# Patient Record
Sex: FEMALE | Race: WHITE | NOT HISPANIC OR LATINO | Employment: FULL TIME | URBAN - METROPOLITAN AREA
[De-identification: names, ages, dates, MRNs, and addresses within clinical notes are randomized per-mention and may not be internally consistent; named-entity substitution may affect disease eponyms.]

---

## 2018-10-08 ENCOUNTER — TELEMEDICINE2 (OUTPATIENT)
Dept: URGENT CARE | Facility: PHYSICIAN GROUP | Age: 33
End: 2018-10-08

## 2018-10-08 DIAGNOSIS — R05.9 COUGH: ICD-10-CM

## 2018-10-08 DIAGNOSIS — J01.40 ACUTE PANSINUSITIS, RECURRENCE NOT SPECIFIED: ICD-10-CM

## 2018-10-08 PROCEDURE — 99203 OFFICE O/P NEW LOW 30 MIN: CPT | Performed by: PHYSICIAN ASSISTANT

## 2018-10-08 RX ORDER — ESCITALOPRAM OXALATE 10 MG/1
10 TABLET ORAL DAILY
COMMUNITY
End: 2020-01-24

## 2018-10-08 RX ORDER — BENZONATATE 100 MG/1
100 CAPSULE ORAL 3 TIMES DAILY PRN
Qty: 30 CAP | Refills: 0 | Status: SHIPPED | OUTPATIENT
Start: 2018-10-08 | End: 2020-01-24

## 2018-10-08 RX ORDER — AMOXICILLIN AND CLAVULANATE POTASSIUM 875; 125 MG/1; MG/1
1 TABLET, FILM COATED ORAL 2 TIMES DAILY
Qty: 14 TAB | Refills: 0 | Status: SHIPPED | OUTPATIENT
Start: 2018-10-08 | End: 2018-10-15

## 2018-10-08 ASSESSMENT — ENCOUNTER SYMPTOMS
CHILLS: 1
DIZZINESS: 0
VOMITING: 0
HEADACHES: 1
SINUS PRESSURE: 1
ABDOMINAL PAIN: 0
SINUS PAIN: 1
SORE THROAT: 1
SHORTNESS OF BREATH: 0
MYALGIAS: 1
EYE DISCHARGE: 1
WHEEZING: 0
COUGH: 1

## 2018-10-08 NOTE — PROGRESS NOTES
Subjective:      Stew Maria is a 33 y.o. female who presents with Cough (Productive green cough, bilateral eye irritation, sinus pressure and pain, fever x 3-4 days )            Patient is a pleasant 33-year-old female via telemedicine who reports sinus congestion, pressure for the last 2-3 weeks.  Patient reports she thought this was allergies at first as she also had itchy eyes and a runny nose with intermittent sneezing.  She reports that then she developed the significant pressure, dental pain, ear pain and now is with a cough.  She does report an intermittent sore throat in the mornings however feels this is secondary to postnasal drainage.  She has been utilizing Mucinex and Flonase with mild relief his symptoms.  She reports subjective fevers however denies any at this time.  She denies any shortness of breath or wheezing.      Sinusitis   This is a new problem. Episode onset: 2-3 weeks ago. The problem has been waxing and waning since onset. Associated symptoms include chills, congestion, coughing, ear pain, headaches, sinus pressure and a sore throat. Pertinent negatives include no shortness of breath. Treatments tried: As above.       Review of Systems   Constitutional: Positive for chills and malaise/fatigue.   HENT: Positive for congestion, ear pain, sinus pain, sinus pressure and sore throat. Negative for ear discharge.         Pos. For ear pressure     Eyes: Positive for discharge.   Respiratory: Positive for cough. Negative for shortness of breath and wheezing.    Gastrointestinal: Negative for abdominal pain and vomiting.   Genitourinary: Negative for dysuria and urgency.   Musculoskeletal: Positive for myalgias.   Skin: Negative for itching and rash.   Neurological: Positive for headaches. Negative for dizziness.   All other systems reviewed and are negative.         Objective:     There were no vitals taken for this visit.     Physical Exam   Constitutional: She appears well-developed  and well-nourished.   HENT:   Nose: Nose normal.   Bilateral maxillary sinus tenderness when patient palpates.   Eyes: Pupils are equal, round, and reactive to light. EOM are normal.   Neck: Normal range of motion. Neck supple.   Pulmonary/Chest: Effort normal. No respiratory distress.   Neurological: She is alert.   Skin:   No rash noted on areas exposed during telemedicine.               Assessment/Plan:     1. Acute pansinusitis, recurrence not specified  - amoxicillin-clavulanate (AUGMENTIN) 875-125 MG Tab; Take 1 Tab by mouth 2 times a day for 7 days.  Dispense: 14 Tab; Refill: 0    2. Cough  - benzonatate (TESSALON) 100 MG Cap; Take 1 Cap by mouth 3 times a day as needed for Cough.  Dispense: 30 Cap; Refill: 0    Due to duration of symptoms, sinus tenderness, and failure of OTC therapies- ABX was written to tx. For bacterial etiology for sinusitis.   Continue OTC supportive therapies- Flonase, OTC allergy meds, avoid night time dairy. Increase fluids. Humidification.   Patient given precautionary s/sx that mandate immediate follow up and evaluation in the ED. Advised of risks of not doing so.    DDX, Supportive care, and indications for immediate follow-up discussed with patient.    Instructed to return to clinic or nearest emergency department if we are not available for any change in condition, further concerns, or worsening of symptoms.    The patient demonstrated a good understanding and agreed with the treatment plan      Patient was presented for a telehealth consultation via secure and encrypted videoconferencing technology.

## 2019-04-16 ENCOUNTER — HOSPITAL ENCOUNTER (OUTPATIENT)
Dept: LAB | Facility: MEDICAL CENTER | Age: 34
End: 2019-04-16
Attending: OBSTETRICS & GYNECOLOGY
Payer: COMMERCIAL

## 2019-04-16 PROCEDURE — 84436 ASSAY OF TOTAL THYROXINE: CPT

## 2019-04-16 PROCEDURE — 84443 ASSAY THYROID STIM HORMONE: CPT

## 2019-04-17 LAB
T4 SERPL-MCNC: 7.9 UG/DL (ref 4–12)
TSH SERPL DL<=0.005 MIU/L-ACNC: 0.88 UIU/ML (ref 0.38–5.33)

## 2020-01-24 ENCOUNTER — OFFICE VISIT (OUTPATIENT)
Dept: URGENT CARE | Facility: CLINIC | Age: 35
End: 2020-01-24
Payer: COMMERCIAL

## 2020-01-24 VITALS
RESPIRATION RATE: 12 BRPM | WEIGHT: 207.01 LBS | OXYGEN SATURATION: 98 % | SYSTOLIC BLOOD PRESSURE: 122 MMHG | HEART RATE: 84 BPM | HEIGHT: 68 IN | DIASTOLIC BLOOD PRESSURE: 76 MMHG | BODY MASS INDEX: 31.37 KG/M2 | TEMPERATURE: 98.3 F

## 2020-01-24 DIAGNOSIS — J02.8 ACUTE PHARYNGITIS DUE TO OTHER SPECIFIED ORGANISMS: ICD-10-CM

## 2020-01-24 DIAGNOSIS — J01.40 ACUTE NON-RECURRENT PANSINUSITIS: ICD-10-CM

## 2020-01-24 PROCEDURE — 99204 OFFICE O/P NEW MOD 45 MIN: CPT | Performed by: INTERNAL MEDICINE

## 2020-01-24 RX ORDER — CEFDINIR 300 MG/1
300 CAPSULE ORAL 2 TIMES DAILY
Qty: 14 CAP | Refills: 0 | Status: SHIPPED
Start: 2020-01-24 | End: 2020-01-24 | Stop reason: SDUPTHER

## 2020-01-24 RX ORDER — CEFDINIR 300 MG/1
300 CAPSULE ORAL 2 TIMES DAILY
Qty: 14 CAP | Refills: 0 | Status: SHIPPED | OUTPATIENT
Start: 2020-01-24 | End: 2020-01-31

## 2020-01-24 RX ORDER — FLUOXETINE HYDROCHLORIDE 40 MG/1
40 CAPSULE ORAL DAILY
COMMUNITY
End: 2024-02-21

## 2020-01-24 ASSESSMENT — ENCOUNTER SYMPTOMS
RHINORRHEA: 1
COUGH: 1
FEVER: 1
WHEEZING: 1
CHILLS: 1
SHORTNESS OF BREATH: 1
MYALGIAS: 1
SORE THROAT: 1

## 2020-01-24 NOTE — PROGRESS NOTES
Subjective:     Stew Maria is a 34 y.o. female who presents for Cough (x 1 week / neck pain / wheezing at night / sore throat)       Cough   This is a new problem. The current episode started in the past 7 days. The problem has been gradually worsening. The problem occurs constantly. The cough is productive of purulent sputum. Associated symptoms include chills, a fever (subjective), myalgias, nasal congestion, rhinorrhea, a sore throat, shortness of breath and wheezing. Pertinent negatives include no rash.   History reviewed. No pertinent past medical history.  Past Surgical History:   Procedure Laterality Date   • LEEP       Social History     Socioeconomic History   • Marital status:      Spouse name: Not on file   • Number of children: Not on file   • Years of education: Not on file   • Highest education level: Not on file   Occupational History   • Not on file   Social Needs   • Financial resource strain: Not on file   • Food insecurity:     Worry: Not on file     Inability: Not on file   • Transportation needs:     Medical: Not on file     Non-medical: Not on file   Tobacco Use   • Smoking status: Never Smoker   • Smokeless tobacco: Never Used   Substance and Sexual Activity   • Alcohol use: No   • Drug use: No   • Sexual activity: Not on file   Lifestyle   • Physical activity:     Days per week: Not on file     Minutes per session: Not on file   • Stress: Not on file   Relationships   • Social connections:     Talks on phone: Not on file     Gets together: Not on file     Attends Protestant service: Not on file     Active member of club or organization: Not on file     Attends meetings of clubs or organizations: Not on file     Relationship status: Not on file   • Intimate partner violence:     Fear of current or ex partner: Not on file     Emotionally abused: Not on file     Physically abused: Not on file     Forced sexual activity: Not on file   Other Topics Concern   • Not on file  "  Social History Narrative   • Not on file    History reviewed. No pertinent family history. Review of Systems   Constitutional: Positive for chills and fever (subjective).   HENT: Positive for rhinorrhea and sore throat.    Respiratory: Positive for cough, shortness of breath and wheezing.    Musculoskeletal: Positive for myalgias.   Skin: Negative for rash.   All other systems reviewed and are negative.    Allergies   Allergen Reactions   • Anti-Itch    • Antihistamine [Altaryl]      \"I get into too deep of a sleep.\"      Objective:   /76   Pulse 84   Temp 36.8 °C (98.3 °F) (Temporal)   Resp 12   Ht 1.727 m (5' 8\")   Wt 93.9 kg (207 lb 0.2 oz)   SpO2 98%   BMI 31.48 kg/m²   Physical Exam  Constitutional:       General: She is not in acute distress.     Appearance: She is well-developed.   HENT:      Head: Normocephalic and atraumatic.      Right Ear: Tympanic membrane, ear canal and external ear normal.      Left Ear: Tympanic membrane, ear canal and external ear normal.      Nose: Mucosal edema and rhinorrhea present.      Right Sinus: Maxillary sinus tenderness present.      Left Sinus: Maxillary sinus tenderness present.      Mouth/Throat:      Mouth: Mucous membranes are moist.      Pharynx: Oropharynx is clear. Uvula midline. Posterior oropharyngeal erythema present.      Tonsils: No tonsillar abscesses.   Eyes:      Conjunctiva/sclera: Conjunctivae normal.   Neck:      Musculoskeletal: No neck rigidity.   Cardiovascular:      Rate and Rhythm: Normal rate and regular rhythm.   Pulmonary:      Effort: Pulmonary effort is normal. No respiratory distress.      Breath sounds: Normal breath sounds.   Lymphadenopathy:      Cervical: No cervical adenopathy.   Skin:     General: Skin is warm and dry.      Capillary Refill: Capillary refill takes less than 2 seconds.   Neurological:      Mental Status: She is alert and oriented to person, place, and time.      Sensory: No sensory deficit.      Deep Tendon " Reflexes: Reflexes are normal and symmetric.   Psychiatric:         Mood and Affect: Mood normal.         Behavior: Behavior normal.           Assessment/Plan:   Assessment    1. Acute non-recurrent pansinusitis  - cefdinir (OMNICEF) 300 MG Cap; Take 1 Cap by mouth 2 times a day for 7 days.  Dispense: 14 Cap; Refill: 0    2. Acute pharyngitis due to other specified organisms    Other orders  - fluoxetine (PROZAC) 40 MG capsule; Take 40 mg by mouth every day.      Differential diagnosis, natural history, supportive care, and indications for immediate follow-up discussed.

## 2020-08-19 ENCOUNTER — NURSE TRIAGE (OUTPATIENT)
Dept: HEALTH INFORMATION MANAGEMENT | Facility: OTHER | Age: 35
End: 2020-08-19

## 2020-08-19 NOTE — TELEPHONE ENCOUNTER
Dry scratchy throat, diarrhea, chills, fatigue    No fever      1. Caller Name: Stew Maria                  Call Back Number: [phone] (home)     [facility] PCP or Specialty Provider: Yes [facility] [place]        2.  In the last two weeks, has the patient had any new or worsening symptoms (not explained by alternative diagnosis)? Yes, the patient reports the following COVID-19 consistent symptoms: chills, sore throat, fatigue and diarrhea.    Patient will call her provider at [facility] to see about getting an order for testing.

## 2021-11-01 ENCOUNTER — HOSPITAL ENCOUNTER (OUTPATIENT)
Dept: LAB | Facility: MEDICAL CENTER | Age: 36
End: 2021-11-01
Attending: OBSTETRICS & GYNECOLOGY
Payer: COMMERCIAL

## 2021-11-01 LAB — B-HCG SERPL-ACNC: <1 MIU/ML (ref 0–5)

## 2021-11-01 PROCEDURE — 84702 CHORIONIC GONADOTROPIN TEST: CPT

## 2021-11-01 PROCEDURE — 36415 COLL VENOUS BLD VENIPUNCTURE: CPT

## 2023-03-27 ENCOUNTER — HOSPITAL ENCOUNTER (OUTPATIENT)
Dept: LAB | Facility: MEDICAL CENTER | Age: 38
End: 2023-03-27
Attending: FAMILY MEDICINE
Payer: COMMERCIAL

## 2023-03-27 LAB
ALBUMIN SERPL BCP-MCNC: 4.4 G/DL (ref 3.2–4.9)
ALBUMIN/GLOB SERPL: 1.4 G/DL
ALP SERPL-CCNC: 77 U/L (ref 30–99)
ALT SERPL-CCNC: 31 U/L (ref 2–50)
ANION GAP SERPL CALC-SCNC: 10 MMOL/L (ref 7–16)
AST SERPL-CCNC: 18 U/L (ref 12–45)
BILIRUB SERPL-MCNC: 0.4 MG/DL (ref 0.1–1.5)
BUN SERPL-MCNC: 12 MG/DL (ref 8–22)
CALCIUM ALBUM COR SERPL-MCNC: 8.7 MG/DL (ref 8.5–10.5)
CALCIUM SERPL-MCNC: 9 MG/DL (ref 8.5–10.5)
CHLORIDE SERPL-SCNC: 104 MMOL/L (ref 96–112)
CHOLEST SERPL-MCNC: 188 MG/DL (ref 100–199)
CO2 SERPL-SCNC: 25 MMOL/L (ref 20–33)
CREAT SERPL-MCNC: 0.83 MG/DL (ref 0.5–1.4)
EST. AVERAGE GLUCOSE BLD GHB EST-MCNC: 126 MG/DL
FASTING STATUS PATIENT QL REPORTED: NORMAL
GFR SERPLBLD CREATININE-BSD FMLA CKD-EPI: 94 ML/MIN/1.73 M 2
GLOBULIN SER CALC-MCNC: 3.2 G/DL (ref 1.9–3.5)
GLUCOSE SERPL-MCNC: 113 MG/DL (ref 65–99)
HBA1C MFR BLD: 6 % (ref 4–5.6)
HDLC SERPL-MCNC: 46 MG/DL
LDLC SERPL CALC-MCNC: 118 MG/DL
POTASSIUM SERPL-SCNC: 4.2 MMOL/L (ref 3.6–5.5)
PROT SERPL-MCNC: 7.6 G/DL (ref 6–8.2)
SODIUM SERPL-SCNC: 139 MMOL/L (ref 135–145)
TRIGL SERPL-MCNC: 121 MG/DL (ref 0–149)
TSH SERPL DL<=0.005 MIU/L-ACNC: 0.98 UIU/ML (ref 0.38–5.33)

## 2023-03-27 PROCEDURE — 80053 COMPREHEN METABOLIC PANEL: CPT

## 2023-03-27 PROCEDURE — 83036 HEMOGLOBIN GLYCOSYLATED A1C: CPT

## 2023-03-27 PROCEDURE — 36415 COLL VENOUS BLD VENIPUNCTURE: CPT

## 2023-03-27 PROCEDURE — 80061 LIPID PANEL: CPT

## 2023-03-27 PROCEDURE — 84443 ASSAY THYROID STIM HORMONE: CPT

## 2023-10-02 ENCOUNTER — OFFICE VISIT (OUTPATIENT)
Dept: URGENT CARE | Facility: CLINIC | Age: 38
End: 2023-10-02
Payer: COMMERCIAL

## 2023-10-02 VITALS
WEIGHT: 231.48 LBS | HEIGHT: 64 IN | DIASTOLIC BLOOD PRESSURE: 76 MMHG | BODY MASS INDEX: 39.52 KG/M2 | TEMPERATURE: 98.5 F | SYSTOLIC BLOOD PRESSURE: 108 MMHG | RESPIRATION RATE: 16 BRPM | OXYGEN SATURATION: 97 % | HEART RATE: 94 BPM

## 2023-10-02 DIAGNOSIS — J06.9 UPPER RESPIRATORY TRACT INFECTION, UNSPECIFIED TYPE: ICD-10-CM

## 2023-10-02 DIAGNOSIS — R05.1 ACUTE COUGH: ICD-10-CM

## 2023-10-02 DIAGNOSIS — U07.1 COVID: Primary | ICD-10-CM

## 2023-10-02 LAB
FLUAV RNA SPEC QL NAA+PROBE: NEGATIVE
FLUBV RNA SPEC QL NAA+PROBE: NEGATIVE
RSV RNA SPEC QL NAA+PROBE: NEGATIVE
SARS-COV-2 RNA RESP QL NAA+PROBE: POSITIVE

## 2023-10-02 PROCEDURE — 99213 OFFICE O/P EST LOW 20 MIN: CPT | Performed by: PHYSICIAN ASSISTANT

## 2023-10-02 PROCEDURE — 3078F DIAST BP <80 MM HG: CPT | Performed by: PHYSICIAN ASSISTANT

## 2023-10-02 PROCEDURE — 0241U POCT CEPHEID COV-2, FLU A/B, RSV - PCR: CPT | Performed by: PHYSICIAN ASSISTANT

## 2023-10-02 PROCEDURE — 3074F SYST BP LT 130 MM HG: CPT | Performed by: PHYSICIAN ASSISTANT

## 2023-10-02 RX ORDER — SPIRONOLACTONE 25 MG/1
25 TABLET ORAL DAILY
COMMUNITY

## 2023-10-02 RX ORDER — DULOXETIN HYDROCHLORIDE 60 MG/1
CAPSULE, DELAYED RELEASE ORAL
COMMUNITY
Start: 2023-09-25

## 2023-10-02 RX ORDER — MIRTAZAPINE 15 MG/1
TABLET, FILM COATED ORAL
COMMUNITY
Start: 2023-09-25 | End: 2024-02-21

## 2023-10-02 RX ORDER — ALBUTEROL SULFATE 90 UG/1
2 AEROSOL, METERED RESPIRATORY (INHALATION) EVERY 6 HOURS PRN
Qty: 8.5 G | Refills: 1 | Status: SHIPPED | OUTPATIENT
Start: 2023-10-02

## 2023-10-02 RX ORDER — BENZONATATE 100 MG/1
100 CAPSULE ORAL 3 TIMES DAILY PRN
Qty: 60 CAPSULE | Refills: 0 | Status: SHIPPED
Start: 2023-10-02 | End: 2024-02-21

## 2023-10-02 ASSESSMENT — ENCOUNTER SYMPTOMS
CHILLS: 1
PSYCHIATRIC NEGATIVE: 1
FEVER: 1
GASTROINTESTINAL NEGATIVE: 1
SORE THROAT: 1
COUGH: 1
MYALGIAS: 1
HEADACHES: 1
CARDIOVASCULAR NEGATIVE: 1
EYES NEGATIVE: 1

## 2023-10-02 NOTE — PATIENT INSTRUCTIONS
Delsym for cough  Tylenol and ibuprofen as needed for fever and chills per packaged directions  Humidifier and hot steam showers  Coat throat tea for throat pain

## 2023-10-02 NOTE — PROGRESS NOTES
Chief Complaint   Patient presents with    Congestion     Runny Nose, Congestion, Cough, Sore Throat. Pt reports Sx started about 24hrs ago.        HISTORY OF PRESENT ILLNESS: Patient is a 38 y.o. female who presents today because upper respiratory infection that started yesterday.  She states she started feeling poorly Saturday, stuffy.  She describes sinus congestion pressure/headache as well as ear pain and sore throat and neck pain.  She has also been noting some respiratory/chest tightness and a nonproductive cough.  She has been taking over-the-counter cough and cold medicine and does have a history of bronchitis.  She believes she has an inhaler, but is not sure.  She denies fever or chills.  She denies any known COVID contacts.    There are no problems to display for this patient.      Allergies:Anti-itch and Antihistamine [altaryl]    Current Outpatient Medications Ordered in Epic   Medication Sig Dispense Refill    DULoxetine (CYMBALTA) 60 MG Cap DR Particles delayed-release capsule       spironolactone (ALDACTONE) 25 MG Tab Take 25 mg by mouth every day.      mirtazapine (REMERON) 15 MG Tab  (Patient not taking: Reported on 10/2/2023)      fluoxetine (PROZAC) 40 MG capsule Take 40 mg by mouth every day. (Patient not taking: Reported on 10/2/2023)      ibuprofen (MOTRIN) 600 MG TABS Take 1 Tab by mouth every 6 hours as needed (Cramping). (Patient not taking: Reported on 1/24/2020) 30 Tab 3     No current Epic-ordered facility-administered medications on file.       History reviewed. No pertinent past medical history.    Social History     Tobacco Use    Smoking status: Never    Smokeless tobacco: Never   Substance Use Topics    Alcohol use: No    Drug use: No       No family status information on file.   History reviewed. No pertinent family history.    Review of Systems   Constitutional:  Positive for chills, fever and malaise/fatigue.   HENT:  Positive for congestion and sore throat.    Eyes: Negative.   "  Respiratory:  Positive for cough.    Cardiovascular: Negative.    Gastrointestinal: Negative.    Genitourinary: Negative.    Musculoskeletal:  Positive for joint pain and myalgias.   Skin: Negative.    Neurological:  Positive for headaches.   Endo/Heme/Allergies: Negative.    Psychiatric/Behavioral: Negative.          Exam:  /76   Pulse 94   Temp 36.9 °C (98.5 °F) (Temporal)   Resp 16   Ht 1.626 m (5' 4\")   Wt 105 kg (231 lb 7.7 oz)   SpO2 97%     Physical Exam   Nursing note and Vitals Reviewed.    Constitutional:   Appropriately groomed, pleasant affect, well nourished, in NAD.    Head:   Normocephalic, atraumatic.    Eyes:   PERRLA, EOM's full, sclera white, conjunctiva not erythematous, and medial canthus without exudate bilaterally.    Ears:  Auricle and tragus non-tender to manipulation.  No pre-auricular lymphadenopathy or mastoid ttp.  EACs with mild cerumen bilaterally, not erythematous.  TM’s pearly gray with cone of light present and umbo and malleolus visible bilaterally.  No bulging or fluid bubbles present in middle ear.  Hearing grossly intact to voice.    Nose:  Nares patent bilaterally.  Nasal mucosa edematous with clear rhinorrhea bilaterally. Sinuses not tender to percussion.    Throat:  Dentition wnl, mucosa moist without lesions.  Oropharynx erythematous, with no palatine tonsils bilaterally.    Mild post nasal drainage present.  Soft palate rises symmetrically bilaterally and uvula midline.      Neck: Neck supple, with moderate anterior lymphadenopathy that is soft and mobile to palpation. Thyroid non-palpable without tenderness or nodules. No supraclavicular lymphadenopathy.    Lungs:  Respiratory effort not labored without accessory muscle use.  Lungs clear to auscultation bilaterally without wheezes, rales, or rhonchi.    Heart:  RRR, without murmurs rubs or gallops.  Radial and dorsalis pedis pulse 2+ bilaterally.  No LE edema.    Musculoskeletal:  Gait non-antalgic with a " narrow base.    Derm:  Skin without rashes or lesions with good turgor pressure.      Psychiatric:  Mood, affect, and judgement appropriate.    Please note that this dictation was created using voice recognition software. I have made every reasonable attempt to correct obvious errors, but I expect that there are errors of grammar and possibly content that I did not discover before finalizing the note.    Assessment/Plan:  1. COVID        2. Upper respiratory tract infection, unspecified type  POCT CEPHEID COV-2, FLU A/B, RSV - PCR      3. Acute cough  benzonatate (TESSALON) 100 MG Cap    albuterol 108 (90 Base) MCG/ACT Aero Soln inhalation aerosol        Patient presents with 2 day history of suspected viral URI.  Positive Covid test.  We discussed Paxlovid.  She is at lower risk for hospitalization and we also reviewed the possibility of rebound infection once discontinuing as well as metallic taste and multiple drug interactions.  She has decided not to proceed with antiviral therapy.  Reviewed symptom support measures and recommended ibuprofen/Tylenol as well as Delsym for cough.  Prescribed Benza De La Torre and albuterol inhaler.  Reviewed signs symptoms of bacterial infection when to return to clinic.    Instructed to return to Urgent Care or nearest Emergency Department if symptoms fail to improve, for any change in condition, further concerns, or new concerning symptoms. Patient states understanding of the plan of care and discharge instructions.    Wild Cano PA-C

## 2024-02-21 ENCOUNTER — OFFICE VISIT (OUTPATIENT)
Dept: URGENT CARE | Facility: CLINIC | Age: 39
End: 2024-02-21
Payer: COMMERCIAL

## 2024-02-21 VITALS
TEMPERATURE: 97.3 F | BODY MASS INDEX: 41.83 KG/M2 | HEART RATE: 90 BPM | OXYGEN SATURATION: 95 % | HEIGHT: 64 IN | RESPIRATION RATE: 16 BRPM | SYSTOLIC BLOOD PRESSURE: 112 MMHG | DIASTOLIC BLOOD PRESSURE: 74 MMHG | WEIGHT: 245 LBS

## 2024-02-21 DIAGNOSIS — M54.32 SCIATICA OF LEFT SIDE: ICD-10-CM

## 2024-02-21 PROCEDURE — 3074F SYST BP LT 130 MM HG: CPT | Performed by: PHYSICIAN ASSISTANT

## 2024-02-21 PROCEDURE — 3078F DIAST BP <80 MM HG: CPT | Performed by: PHYSICIAN ASSISTANT

## 2024-02-21 PROCEDURE — 99213 OFFICE O/P EST LOW 20 MIN: CPT | Performed by: PHYSICIAN ASSISTANT

## 2024-02-21 RX ORDER — TIRZEPATIDE 2.5 MG/.5ML
INJECTION, SOLUTION SUBCUTANEOUS
COMMUNITY
Start: 2024-01-29

## 2024-02-21 RX ORDER — PREDNISONE 20 MG/1
TABLET ORAL
Qty: 10 TABLET | Refills: 0 | Status: SHIPPED | OUTPATIENT
Start: 2024-02-21

## 2024-02-21 RX ORDER — LIDOCAINE 50 MG/G
3 PATCH TOPICAL EVERY 12 HOURS
Qty: 30 PATCH | Refills: 0 | Status: SHIPPED | OUTPATIENT
Start: 2024-02-21

## 2024-02-21 RX ORDER — CYCLOBENZAPRINE HCL 10 MG
10 TABLET ORAL 3 TIMES DAILY PRN
Qty: 30 TABLET | Refills: 0 | Status: SHIPPED | OUTPATIENT
Start: 2024-02-21

## 2024-02-21 RX ORDER — BUPROPION HCL 300 MG
TABLET, EXTENDED RELEASE 24 HR ORAL
COMMUNITY
Start: 2024-01-01

## 2024-02-21 ASSESSMENT — ENCOUNTER SYMPTOMS
VOMITING: 0
PARESTHESIAS: 1
COUGH: 0
SHORTNESS OF BREATH: 0
CHILLS: 0
HEADACHES: 0
FEVER: 0
NUMBNESS: 1
SENSORY CHANGE: 1
ABDOMINAL PAIN: 0
MYALGIAS: 1
PERIANAL NUMBNESS: 0
WEAKNESS: 0
NAUSEA: 0
BACK PAIN: 1
TINGLING: 0
BOWEL INCONTINENCE: 0
LEG PAIN: 1
PARESIS: 0

## 2024-02-21 NOTE — PROGRESS NOTES
"Griselda Maria is a 38 y.o. female who presents with Back Pain (Lower back pain, L leg numbness, hurts to sit x Sunday )            Back Pain  This is a new problem. Episode onset: 3 days ago. No known injury. The problem occurs constantly. The problem has been gradually worsening since onset. The pain is present in the gluteal and lumbar spine (left gluteal and lumbar spine- radiating down left leg). The pain is moderate. The symptoms are aggravated by sitting and lying down. Associated symptoms include leg pain, numbness and paresthesias (left leg). Pertinent negatives include no abdominal pain, bladder incontinence, bowel incontinence, chest pain, dysuria, fever, headaches, paresis, perianal numbness, tingling or weakness. She has tried NSAIDs and chiropractic manipulation for the symptoms. The treatment provided no relief.       No past medical history on file.      Past Surgical History:   Procedure Laterality Date    LEEP           No family history on file.      Anti-itch      Medications, Allergies, and current problem list reviewed today in Epic        Review of Systems   Constitutional:  Negative for chills, fever and malaise/fatigue.   Respiratory:  Negative for cough and shortness of breath.    Cardiovascular:  Negative for chest pain.   Gastrointestinal:  Negative for abdominal pain, bowel incontinence, nausea and vomiting.   Genitourinary:  Negative for bladder incontinence and dysuria.        No urinary or bowel incontinence   Musculoskeletal:  Positive for back pain and myalgias.   Neurological:  Positive for sensory change, numbness and paresthesias (left leg). Negative for tingling, weakness and headaches.          .All other systems reviewed and are negative.         Objective     /74 (BP Location: Right arm, Patient Position: Sitting, BP Cuff Size: Large adult)   Pulse 90   Temp 36.3 °C (97.3 °F) (Temporal)   Resp 16   Ht 1.626 m (5' 4\")   Wt 111 kg (245 lb)   " SpO2 95%   BMI 42.05 kg/m²      Physical Exam  Constitutional:       General: She is not in acute distress.     Appearance: She is not ill-appearing.   HENT:      Head: Normocephalic.   Eyes:      Conjunctiva/sclera: Conjunctivae normal.   Cardiovascular:      Rate and Rhythm: Normal rate and regular rhythm.   Pulmonary:      Effort: Pulmonary effort is normal. No respiratory distress.      Breath sounds: No stridor. No wheezing.   Musculoskeletal:        Back:    Skin:     General: Skin is warm and dry.   Neurological:      General: No focal deficit present.      Mental Status: She is alert and oriented to person, place, and time.   Psychiatric:         Mood and Affect: Mood normal.         Behavior: Behavior normal.         Thought Content: Thought content normal.         Judgment: Judgment normal.                             Assessment & Plan        1. Sciatica of left side    - predniSONE (DELTASONE) 20 MG Tab; 2 tabs po daily x 5 days.  Dispense: 10 Tablet; Refill: 0  - cyclobenzaprine (FLEXERIL) 10 mg Tab; Take 1 Tablet by mouth 3 times a day as needed for Moderate Pain or Muscle Spasms.  Dispense: 30 Tablet; Refill: 0  Sedation side effects discussed. No Driving or alcohol with medication given.    - lidocaine (LIDODERM) 5 % Patch; Place 3 Patches on the skin every 12 hours. Up to 3 patches every 12 hours. Must remove patches after 12 hours.  Dispense: 30 Patch; Refill: 0  - Referral to Pain Clinic- Physiatry if symptoms persist.    Differential diagnoses, Supportive care, and indications for immediate follow-up discussed with patient.   Pathogenesis of diagnosis discussed including typical length and natural progression.   Instructed to return to clinic or nearest emergency department for any change in condition, further concerns, or worsening of symptoms.      The patient demonstrated a good understanding and agreed with the treatment plan.      Dorota Alonso P.A.-C.

## 2024-02-27 ENCOUNTER — OFFICE VISIT (OUTPATIENT)
Dept: PHYSICAL MEDICINE AND REHAB | Facility: MEDICAL CENTER | Age: 39
End: 2024-02-27
Payer: COMMERCIAL

## 2024-02-27 VITALS
TEMPERATURE: 97.7 F | HEIGHT: 64 IN | HEART RATE: 91 BPM | BODY MASS INDEX: 41.21 KG/M2 | SYSTOLIC BLOOD PRESSURE: 110 MMHG | OXYGEN SATURATION: 93 % | WEIGHT: 241.4 LBS | DIASTOLIC BLOOD PRESSURE: 78 MMHG

## 2024-02-27 DIAGNOSIS — R20.2 NUMBNESS AND TINGLING OF LEFT LEG: ICD-10-CM

## 2024-02-27 DIAGNOSIS — R20.0 NUMBNESS AND TINGLING OF LEFT LEG: ICD-10-CM

## 2024-02-27 DIAGNOSIS — R29.898 LEFT LEG WEAKNESS: ICD-10-CM

## 2024-02-27 DIAGNOSIS — M54.16 LEFT LUMBAR RADICULOPATHY: ICD-10-CM

## 2024-02-27 DIAGNOSIS — M54.42 ACUTE LEFT-SIDED LOW BACK PAIN WITH LEFT-SIDED SCIATICA: ICD-10-CM

## 2024-02-27 PROCEDURE — 99204 OFFICE O/P NEW MOD 45 MIN: CPT | Performed by: STUDENT IN AN ORGANIZED HEALTH CARE EDUCATION/TRAINING PROGRAM

## 2024-02-27 PROCEDURE — 1125F AMNT PAIN NOTED PAIN PRSNT: CPT | Performed by: STUDENT IN AN ORGANIZED HEALTH CARE EDUCATION/TRAINING PROGRAM

## 2024-02-27 PROCEDURE — 3078F DIAST BP <80 MM HG: CPT | Performed by: STUDENT IN AN ORGANIZED HEALTH CARE EDUCATION/TRAINING PROGRAM

## 2024-02-27 PROCEDURE — 3074F SYST BP LT 130 MM HG: CPT | Performed by: STUDENT IN AN ORGANIZED HEALTH CARE EDUCATION/TRAINING PROGRAM

## 2024-02-27 RX ORDER — GABAPENTIN 300 MG/1
CAPSULE ORAL
Qty: 90 CAPSULE | Refills: 2 | Status: SHIPPED | OUTPATIENT
Start: 2024-02-27

## 2024-02-27 RX ORDER — MELOXICAM 15 MG/1
15 TABLET ORAL
Qty: 30 TABLET | Refills: 0 | Status: SHIPPED | OUTPATIENT
Start: 2024-02-27 | End: 2024-03-28

## 2024-02-27 ASSESSMENT — PATIENT HEALTH QUESTIONNAIRE - PHQ9: CLINICAL INTERPRETATION OF PHQ2 SCORE: 0

## 2024-02-27 ASSESSMENT — PAIN SCALES - GENERAL: PAINLEVEL: 10=SEVERE PAIN

## 2024-02-27 NOTE — PROGRESS NOTES
New Patient Note    Interventional Pain and Spine  Physiatry (Physical Medicine and Rehabilitation)     Patient Name: Stew Maria  : 1985  Date of Service: 2024  PCP: Sho Shirley M.D.  Referring Provider: Dorota Alonso P.A.*    Chief Complaint:   Chief Complaint   Patient presents with    New Patient     Sciatica of left side       HPI  HISTORY FROM INITIAL VISIT (2024):  Stew Maria is a 38 y.o. female who presents today with low back pain radiating down the left leg.  Started 10 days ago with no known inciting event.  Feels like a fluctuating aching sharp pain.  She also endorses numbness and burning at her left posterolateral thigh and her left lateral calf and heel. Endorses weakness in her left leg.  Pain interferes with her ability to walk, or stand or sit for a prolonged time.     Pain right now is 10/10 on the numeric pain scale. Her pain at its best-worse level during the course of the day is typically 9-10/10, respectively.  Pain worsens with walking, bending forward, bending backwards, walking upstairs, walking downstairs, coughing, and sneezing and improves with nothing. Her pain significantly interferes with ADLs. The patient otherwise denies radiating pain, new focal weakness, numbness, or bladder/bowel incontinence. She reports having fluctuating left sciatica for years.  She has managed this with chiropractic therapy.      The patient has not done a provider driven physical therapy program for this problem.    Patient has tried the following medications with varied success (current meds in bold):   prednisone   flexeril as prescribed by UC - no significant improvement  Ibuprofen 1000 mg-no improvement    Therapeutic modalities and interventional therapies to date include:  -No injections    Psychological testing for pain as depression and pain commonly coexist and need to both be treated.     Opioid Risk Score: 2      Interpretation of Opioid Risk Score    Score 0-3 = Low risk of abuse. Do UDS at least once per year.  Score 4-7 = Moderate risk of abuse. Do UDS 1-4 times per year.  Score 8+ = High risk of abuse. Refer to specialist.    PHQ      2/27/2024     8:00 AM   Depression Screen (PHQ-2/PHQ-9)   PHQ-2 Total Score 0       Interpretation of PHQ-9 Total Score   Score Severity   1-4 No Depression   5-9 Mild Depression   10-14 Moderate Depression   15-19 Moderately Severe Depression   20-27 Severe Depression      Medical records review:  I reviewed the note from the referring provider Dorota Alonso P.A.* including the note dated 2/21/24.    ROS:   Red Flags ROS:   Fever, Chills, Sweats: Denies  Involuntary Weight Loss: Denies  Bladder Incontinence: Denies  Bowel Incontinence: denies  Saddle Anesthesia: Denies    All other systems reviewed and negative.     PMHx:   History reviewed. No pertinent past medical history.    PSHx:   Past Surgical History:   Procedure Laterality Date    LEEP         Family Hx:   History reviewed. No pertinent family history.    Social Hx:  Social History     Socioeconomic History    Marital status:      Spouse name: Not on file    Number of children: Not on file    Years of education: Not on file    Highest education level: Not on file   Occupational History    Not on file   Tobacco Use    Smoking status: Never    Smokeless tobacco: Never   Vaping Use    Vaping Use: Never used   Substance and Sexual Activity    Alcohol use: Yes     Comment: approx 1 per wk    Drug use: No    Sexual activity: Not on file   Other Topics Concern    Not on file   Social History Narrative    Not on file     Social Determinants of Health     Financial Resource Strain: Not on file   Food Insecurity: Not on file   Transportation Needs: Not on file   Physical Activity: Not on file   Stress: Not on file   Social Connections: Not on file   Intimate Partner Violence: Not on file   Housing Stability: Not on file       Allergies:  Allergies   Allergen  Reactions    Anti-Itch        Medications: reviewed on epic.   Outpatient Medications Marked as Taking for the 2/27/24 encounter (Office Visit) with Alanna Mortensen M.D.   Medication Sig Dispense Refill    gabapentin (NEURONTIN) 300 MG Cap Take 300mg at bedtime x 1 week, then 300mg BID x 1 week, then 300mg TID thereafter 90 Capsule 2    meloxicam (MOBIC) 15 MG tablet Take 1 Tablet by mouth 1 time a day as needed for Moderate Pain or Severe Pain for up to 30 days. Take with food. Do not take with other NSAIDs 30 Tablet 0    ZEPBOUND 2.5 MG/0.5ML Solution Auto-injector       WELLBUTRIN  MG XL tablet       predniSONE (DELTASONE) 20 MG Tab 2 tabs po daily x 5 days. 10 Tablet 0    cyclobenzaprine (FLEXERIL) 10 mg Tab Take 1 Tablet by mouth 3 times a day as needed for Moderate Pain or Muscle Spasms. 30 Tablet 0    lidocaine (LIDODERM) 5 % Patch Place 3 Patches on the skin every 12 hours. Up to 3 patches every 12 hours. Must remove patches after 12 hours. 30 Patch 0    DULoxetine (CYMBALTA) 60 MG Cap DR Particles delayed-release capsule       spironolactone (ALDACTONE) 25 MG Tab Take 25 mg by mouth every day.          Current Outpatient Medications on File Prior to Visit   Medication Sig Dispense Refill    ZEPBOUND 2.5 MG/0.5ML Solution Auto-injector       WELLBUTRIN  MG XL tablet       predniSONE (DELTASONE) 20 MG Tab 2 tabs po daily x 5 days. 10 Tablet 0    cyclobenzaprine (FLEXERIL) 10 mg Tab Take 1 Tablet by mouth 3 times a day as needed for Moderate Pain or Muscle Spasms. 30 Tablet 0    lidocaine (LIDODERM) 5 % Patch Place 3 Patches on the skin every 12 hours. Up to 3 patches every 12 hours. Must remove patches after 12 hours. 30 Patch 0    DULoxetine (CYMBALTA) 60 MG Cap DR Particles delayed-release capsule       spironolactone (ALDACTONE) 25 MG Tab Take 25 mg by mouth every day.      albuterol 108 (90 Base) MCG/ACT Aero Soln inhalation aerosol Inhale 2 Puffs every 6 hours as needed for Shortness of  "Breath. 8.5 g 1     No current facility-administered medications on file prior to visit.         EXAMINATION     Physical Exam:   /78 (BP Location: Right arm, Patient Position: Sitting, BP Cuff Size: Adult)   Pulse 91   Temp 36.5 °C (97.7 °F) (Temporal)   Ht 1.626 m (5' 4\")   Wt 110 kg (241 lb 6.5 oz)   SpO2 93%     Constitutional:   Body Habitus: Body mass index is 41.44 kg/m².  Cooperation: Fully cooperates with exam  Appearance: Well-groomed, well-nourished.    Eyes: No scleral icterus to suggest severe liver disease, no proptosis to suggest severe hyperthyroidism    ENT -no obvious auditory deficits, no noticeable facial droop     Skin -no rashes or lesions noted     Respiratory-  breathing comfortably on room air, no audible wheezing    Cardiovascular-distal extremities warm and well perfused.  No lower extremity edema is noted.     Gastrointestinal - no obvious abdominal masses, non-distended    Psychiatric- alert and oriented ×3. Normal affect.     Gait -antalgic gait favoring left leg.  Unable to perform heel walking and toe walking due to pain.      Musculoskeletal and Neuro -     Thoracic/Lumbar Spine/Sacral Spine/Hips   Inspection: No evidence of atrophy in bilateral lower extremities throughout     There is limited active range of motion with lumbar extension    Facet loading maneuver negative bilaterally    Palpation:   Tenderness to palpation over the midline of lumbosacral spine, paraspinal muscles bilaterally, lumbar facets bilaterally spanning approximately L1-L5 levels, and bilateral glutes .     Lumbar spine /hip provocative exam maneuvers  Straight leg raise positive on left, negative on right  FADIR test negative bilaterally  Slump-sit test positive on left, negative on right    SI joint tests  PAULO test negative bilaterally    Key points for the international standards for neurological classification of spinal cord injury (ISNCSCI) to light touch.   Dermatome R L   L2 2 2   L3 2 2 " "  L4 2 1   L5 2 1   S1 2 1   S2 2 2       Motor Exam Lower Extremities  ? Myotome R L   Hip flexion L2 5 4*    Knee extension L3 5 5   Ankle dorsiflexion L4 5 4*    Toe extension L5 5 5   Ankle plantarflexion S1 5 5   *Pain limited    Reflexes  ?  R L   Patella  2+ 2+   Achilles   2+ 2+     Clonus of the ankle negative right, positive left       MEDICAL DECISION MAKING    Medical records review: see under HPI section.     DATA    Labs: Personally reviewed at today's visit:     Lab Results   Component Value Date/Time    SODIUM 139 03/27/2023 12:22 PM    POTASSIUM 4.2 03/27/2023 12:22 PM    CHLORIDE 104 03/27/2023 12:22 PM    CO2 25 03/27/2023 12:22 PM    ANION 10.0 03/27/2023 12:22 PM    GLUCOSE 113 (H) 03/27/2023 12:22 PM    BUN 12 03/27/2023 12:22 PM    CREATININE 0.83 03/27/2023 12:22 PM    CREATININE 0.8 01/13/2005 09:50 PM    CALCIUM 9.0 03/27/2023 12:22 PM    ASTSGOT 18 03/27/2023 12:22 PM    ALTSGPT 31 03/27/2023 12:22 PM    TBILIRUBIN 0.4 03/27/2023 12:22 PM    ALBUMIN 4.4 03/27/2023 12:22 PM    TOTPROTEIN 7.6 03/27/2023 12:22 PM    GLOBULIN 3.2 03/27/2023 12:22 PM    AGRATIO 1.4 03/27/2023 12:22 PM       No results found for: \"PROTHROMBTM\", \"INR\"     Lab Results   Component Value Date/Time    WBC 11.8 (H) 05/31/2015 04:52 AM    RBC 3.67 (L) 05/31/2015 04:52 AM    HEMOGLOBIN 11.3 (L) 05/31/2015 04:52 AM    HEMATOCRIT 34.0 (L) 05/31/2015 04:52 AM    MCV 92.6 05/31/2015 04:52 AM    MCH 30.8 05/31/2015 04:52 AM    MCHC 33.2 (L) 05/31/2015 04:52 AM    MPV 11.7 05/31/2015 04:52 AM    NEUTSPOLYS 73.60 (H) 05/29/2015 10:20 AM    LYMPHOCYTES 20.00 (L) 05/29/2015 10:20 AM    MONOCYTES 5.50 05/29/2015 10:20 AM    EOSINOPHILS 0.40 05/29/2015 10:20 AM    BASOPHILS 0.20 05/29/2015 10:20 AM        Lab Results   Component Value Date/Time    HBA1C 6.0 (H) 03/27/2023 12:22 PM        Imaging:   I personally reviewed following images, these are my reads  No relevant imaging available for review at the time of today's " visit        IMAGING radiology reads. I reviewed the following radiology reads                                                                  Results for orders placed during the hospital encounter of 06    DX-CERVICAL SPINE-4+ VIEWS    Impression  IMPRESSION:    CERVICAL SPINE STRAIGHTENING WITH NO EVIDENCE OF FRACTURE.    [facility]:dxm                                     Results for orders placed during the hospital encounter of 06    DX-THORACIC SPINE-2 VIEWS    Impression  IMPRESSION:    OVERALL UNREMARKABLE LIMITED THORACIC SPINE SERIES.    RG:dxm      Read By BETO LESLIE MD on 2006  7:07PM  : DXM Transcription Date: 2006 11:26PM  THIS DOCUMENT HAS BEEN ELECTRONICALLY SIGNED BY: BETO LESLIE MD on 2006  8:08AM            Diagnosis  Visit Diagnoses     ICD-10-CM   1. Acute left-sided low back pain with left-sided sciatica  M54.42   2. Numbness and tingling of left leg  R20.0    R20.2   3. Left leg weakness  R29.898   4. Left lumbar radiculopathy  M54.16         ASSESSMENT AND PLAN:  Stew Maria ( 1985) is a female presenting with pain radiating down the left leg with left leg weakness, likely consistent with left lumbar radiculopathy given her positive left straight leg raise and slump test.  Pain is severe, interfering with her ability to safely ambulate at this time.  She is a fall risk due to her pain.  For her safety, I believe she would benefit from expedited diagnostic workup and treatment including x-ray and MRI at this time.     Stew was seen today for new patient.    Diagnoses and all orders for this visit:    Acute left-sided low back pain with left-sided sciatica  -     DX-LUMBAR SPINE-2 OR 3 VIEWS; Future  -     MR-LUMBAR SPINE-W/O; Future  -     Referral to Physical Therapy    Numbness and tingling of left leg  -     DX-LUMBAR SPINE-2 OR 3 VIEWS; Future  -     MR-LUMBAR SPINE-W/O; Future  -     Referral to Physical  Therapy    Left leg weakness  -     DX-LUMBAR SPINE-2 OR 3 VIEWS; Future  -     MR-LUMBAR SPINE-W/O; Future  -     Referral to Physical Therapy    Left lumbar radiculopathy  -     DX-LUMBAR SPINE-2 OR 3 VIEWS; Future  -     MR-LUMBAR SPINE-W/O; Future  -     Referral to Physical Therapy    Other orders  -     gabapentin (NEURONTIN) 300 MG Cap; Take 300mg at bedtime x 1 week, then 300mg BID x 1 week, then 300mg TID thereafter  -     meloxicam (MOBIC) 15 MG tablet; Take 1 Tablet by mouth 1 time a day as needed for Moderate Pain or Severe Pain for up to 30 days. Take with food. Do not take with other NSAIDs          PLAN  Physical Therapy: I ordered physical therapy to focus on strengthening and stretching as well as a home exercise program.     Home Exercise Program: I provided the patient with a home exercise program focusing on strengthening and stretching.     Diagnostic workup: as above    Medications:   - given the inflammatory component of pain, I will start mobic as above and given the neuropathic component of pain, I will start gabapentin as above   - Counseled on possible side effect of drowsiness and dizziness and discussed that the patient should discontinue this if the side effects are too severe. Counseled patient to initially try taking this medication at bedtime.  -Okay to continue Flexeril    Interventions:   -Pending MRI.  Briefly discussed possibility of epidural element nerve impingement seen on her MRI    Follow-up: After MRI, video okay    Orders Placed This Encounter    DX-LUMBAR SPINE-2 OR 3 VIEWS    MR-LUMBAR SPINE-W/O    Referral to Physical Therapy    gabapentin (NEURONTIN) 300 MG Cap    meloxicam (MOBIC) 15 MG tablet       Alanna Mortensen MD  Interventional Pain and Spine  Physical Medicine and Rehabilitation  [facility]    Dorota Chairez, P.A.*     The above note documents my personal evaluation of this patient. In addition, I have reviewed and confirmed with the patient and MA  the supportive information documented in today's Patient Health Questionnaire and Office Note.     Please note that this dictation was created using voice recognition software. I have made every reasonable attempt to correct obvious errors, but I expect that there are errors of grammar and possibly content that I did not discover before finalizing the note.

## 2024-03-12 ENCOUNTER — HOSPITAL ENCOUNTER (OUTPATIENT)
Dept: RADIOLOGY | Facility: MEDICAL CENTER | Age: 39
End: 2024-03-12
Attending: STUDENT IN AN ORGANIZED HEALTH CARE EDUCATION/TRAINING PROGRAM
Payer: COMMERCIAL

## 2024-03-12 DIAGNOSIS — M54.16 LEFT LUMBAR RADICULOPATHY: ICD-10-CM

## 2024-03-12 DIAGNOSIS — R20.2 NUMBNESS AND TINGLING OF LEFT LEG: ICD-10-CM

## 2024-03-12 DIAGNOSIS — R20.0 NUMBNESS AND TINGLING OF LEFT LEG: ICD-10-CM

## 2024-03-12 DIAGNOSIS — R29.898 LEFT LEG WEAKNESS: ICD-10-CM

## 2024-03-12 DIAGNOSIS — M54.42 ACUTE LEFT-SIDED LOW BACK PAIN WITH LEFT-SIDED SCIATICA: ICD-10-CM

## 2024-03-12 PROCEDURE — 72100 X-RAY EXAM L-S SPINE 2/3 VWS: CPT

## 2024-03-12 PROCEDURE — 72148 MRI LUMBAR SPINE W/O DYE: CPT

## 2024-03-14 ENCOUNTER — APPOINTMENT (OUTPATIENT)
Dept: RADIOLOGY | Facility: MEDICAL CENTER | Age: 39
End: 2024-03-14
Attending: STUDENT IN AN ORGANIZED HEALTH CARE EDUCATION/TRAINING PROGRAM
Payer: COMMERCIAL

## 2024-03-15 ENCOUNTER — TELEMEDICINE (OUTPATIENT)
Dept: PHYSICAL MEDICINE AND REHAB | Facility: MEDICAL CENTER | Age: 39
End: 2024-03-15
Payer: COMMERCIAL

## 2024-03-15 DIAGNOSIS — R20.0 NUMBNESS AND TINGLING OF LEFT LEG: ICD-10-CM

## 2024-03-15 DIAGNOSIS — M54.16 LUMBAR RADICULOPATHY: ICD-10-CM

## 2024-03-15 DIAGNOSIS — M54.16 LEFT LUMBAR RADICULOPATHY: ICD-10-CM

## 2024-03-15 DIAGNOSIS — M54.42 ACUTE LEFT-SIDED LOW BACK PAIN WITH LEFT-SIDED SCIATICA: ICD-10-CM

## 2024-03-15 DIAGNOSIS — R20.2 NUMBNESS AND TINGLING OF LEFT LEG: ICD-10-CM

## 2024-03-15 DIAGNOSIS — R29.898 LEFT LEG WEAKNESS: ICD-10-CM

## 2024-03-15 PROCEDURE — 99214 OFFICE O/P EST MOD 30 MIN: CPT | Performed by: STUDENT IN AN ORGANIZED HEALTH CARE EDUCATION/TRAINING PROGRAM

## 2024-03-15 ASSESSMENT — PATIENT HEALTH QUESTIONNAIRE - PHQ9: CLINICAL INTERPRETATION OF PHQ2 SCORE: 0

## 2024-03-15 NOTE — PROGRESS NOTES
This encounter was conducted via secure encrypted technology using [employer] videoconferencing.   The patient was in a private location in the patient's home in the [facility]  Verbal consent was obtained. Patient's identity was verified.      Follow-up patient Note    Interventional Pain and Spine  Physiatry (Physical Medicine and Rehabilitation)     Patient Name: Stew Maria  : 1985  Date of service: 3/15/2024    Chief Complaint:   Chief Complaint   Patient presents with    Follow-Up     Acute left-sided low back pain with left-sided sciatica           HISTORY FROM INITIAL VISIT (2024):  Stew Maria is a 38 y.o. female who presents today with low back pain radiating down the left leg.  Started 10 days ago with no known inciting event.  Feels like a fluctuating aching sharp pain.  She also endorses numbness and burning at her left posterolateral thigh and her left lateral calf and heel. Endorses weakness in her left leg.  Pain interferes with her ability to walk, or stand or sit for a prolonged time.      Pain right now is 10/10 on the numeric pain scale. Her pain at its best-worse level during the course of the day is typically 9-10/10, respectively.  Pain worsens with walking, bending forward, bending backwards, walking upstairs, walking downstairs, coughing, and sneezing and improves with nothing. Her pain significantly interferes with ADLs. The patient otherwise denies radiating pain, new focal weakness, numbness, or bladder/bowel incontinence. She reports having fluctuating left sciatica for years.  She has managed this with chiropractic therapy.       The patient has not done a provider driven physical therapy program for this problem.     Patient has tried the following medications with varied success (current meds in bold):   prednisone   flexeril as prescribed by UC - no significant improvement  Ibuprofen 1000 mg-no improvement     Therapeutic modalities and  "interventional therapies to date include:  -No injections    HPI  Today's visit   Stew Maria ( 1985) is a female with Diagnoses of Lumbar radiculopathy, Acute left-sided low back pain with left-sided sciatica, Numbness and tingling of left leg, Left lumbar radiculopathy, and Left leg weakness were pertinent to this visit.    Still having pain radiating down her left leg with sensation of \"heat going through the foot.\" Reports ongoing numbness in her left leg and foot. Also with jose horse feeling in her leg. Fluctuates. Endorses weakness in her left foot. Fell last week when she liliana from the toilet and her left foot gave out. Notes that mobic improves the pain.  Taking gabapentin 300 mg 3 times daily with possibly some relief as well.  Pain is severe, interfering with ADLs.    Won't be able to start PT until May. Doing provider driven home exercise program in the meantime.     Pt denies new numbness, tingling, or weakness.      ROS:   Red Flags ROS:   Fever, Chills, Sweats: Denies  Involuntary Weight Loss: Denies  Bladder Incontinence: Denies  Bowel Incontinence: denies  Saddle Anesthesia: Denies    All other systems reviewed and negative.     PMHx:   History reviewed. No pertinent past medical history.    PSHx:   Past Surgical History:   Procedure Laterality Date    LEEP         Family Hx:   History reviewed. No pertinent family history.    Social Hx:  Social History     Socioeconomic History    Marital status:      Spouse name: Not on file    Number of children: Not on file    Years of education: Not on file    Highest education level: Not on file   Occupational History    Not on file   Tobacco Use    Smoking status: Never    Smokeless tobacco: Never   Vaping Use    Vaping Use: Never used   Substance and Sexual Activity    Alcohol use: Yes     Comment: approx 1 per wk    Drug use: No    Sexual activity: Not on file   Other Topics Concern    Not on file   Social History Narrative    " Not on file     Social Determinants of Health     Financial Resource Strain: Not on file   Food Insecurity: Not on file   Transportation Needs: Not on file   Physical Activity: Not on file   Stress: Not on file   Social Connections: Not on file   Intimate Partner Violence: Not on file   Housing Stability: Not on file       Allergies:  Allergies   Allergen Reactions    Anti-Itch        Medications: reviewed on epic.   Outpatient Medications Marked as Taking for the 3/15/24 encounter (Telemedicine) with Alanna Mortensen M.D.   Medication Sig Dispense Refill    gabapentin (NEURONTIN) 300 MG Cap Take 300mg at bedtime x 1 week, then 300mg BID x 1 week, then 300mg TID thereafter 90 Capsule 2    meloxicam (MOBIC) 15 MG tablet Take 1 Tablet by mouth 1 time a day as needed for Moderate Pain or Severe Pain for up to 30 days. Take with food. Do not take with other NSAIDs 30 Tablet 0    WELLBUTRIN  MG XL tablet       DULoxetine (CYMBALTA) 60 MG Cap DR Particles delayed-release capsule       spironolactone (ALDACTONE) 25 MG Tab Take 25 mg by mouth every day.          Current Outpatient Medications on File Prior to Visit   Medication Sig Dispense Refill    gabapentin (NEURONTIN) 300 MG Cap Take 300mg at bedtime x 1 week, then 300mg BID x 1 week, then 300mg TID thereafter 90 Capsule 2    meloxicam (MOBIC) 15 MG tablet Take 1 Tablet by mouth 1 time a day as needed for Moderate Pain or Severe Pain for up to 30 days. Take with food. Do not take with other NSAIDs 30 Tablet 0    WELLBUTRIN  MG XL tablet       DULoxetine (CYMBALTA) 60 MG Cap DR Particles delayed-release capsule       spironolactone (ALDACTONE) 25 MG Tab Take 25 mg by mouth every day.      ZEPBOUND 2.5 MG/0.5ML Solution Auto-injector       predniSONE (DELTASONE) 20 MG Tab 2 tabs po daily x 5 days. 10 Tablet 0    cyclobenzaprine (FLEXERIL) 10 mg Tab Take 1 Tablet by mouth 3 times a day as needed for Moderate Pain or Muscle Spasms. 30 Tablet 0    lidocaine  (LIDODERM) 5 % Patch Place 3 Patches on the skin every 12 hours. Up to 3 patches every 12 hours. Must remove patches after 12 hours. 30 Patch 0    albuterol 108 (90 Base) MCG/ACT Aero Soln inhalation aerosol Inhale 2 Puffs every 6 hours as needed for Shortness of Breath. 8.5 g 1     No current facility-administered medications on file prior to visit.         EXAMINATION     Physical Exam: Limited due to video visit  There were no vitals taken for this visit.    Constitutional:   Body Habitus: There is no height or weight on file to calculate BMI.  Cooperation: Fully cooperates with exam  Appearance: Well-groomed, well-nourished.  Gen: no acute distress  HEENT: NCAT, EOMI  Resp: breathing comfortably on RA    Previous exam  Eyes: No scleral icterus to suggest severe liver disease, no proptosis to suggest severe hyperthyroidism    ENT -no obvious auditory deficits, no noticeable facial droop     Skin -no rashes or lesions noted     Respiratory-  breathing comfortably on room air, no audible wheezing    Cardiovascular-distal extremities warm and well perfused.  No lower extremity edema is noted.     Gastrointestinal - no obvious abdominal masses, non-distended    Psychiatric- alert and oriented ×3. Normal affect.     Gait -antalgic gait favoring left leg.  Unable to perform heel walking and toe walking due to pain.       Musculoskeletal and Neuro -      Thoracic/Lumbar Spine/Sacral Spine/Hips   Inspection: No evidence of atrophy in bilateral lower extremities throughout      There is limited active range of motion with lumbar extension     Facet loading maneuver negative bilaterally     Palpation:   Tenderness to palpation over the midline of lumbosacral spine, paraspinal muscles bilaterally, lumbar facets bilaterally spanning approximately L1-L5 levels, and bilateral glutes .      Lumbar spine /hip provocative exam maneuvers  Straight leg raise positive on left, negative on right  FADIR test negative  "bilaterally  Slump-sit test positive on left, negative on right     SI joint tests  PAULO test negative bilaterally     Key points for the international standards for neurological classification of spinal cord injury (ISNCSCI) to light touch.   Dermatome R L   L2 2 2   L3 2 2   L4 2 1   L5 2 1   S1 2 1   S2 2 2         Motor Exam Lower Extremities  ? Myotome R L   Hip flexion L2 5 4*    Knee extension L3 5 5   Ankle dorsiflexion L4 5 4*    Toe extension L5 5 5   Ankle plantarflexion S1 5 5   *Pain limited     Reflexes  ?   R L   Patella   2+ 2+   Achilles    2+ 2+      Clonus of the ankle negative right, positive left          MEDICAL DECISION MAKING    Medical records review: see under HPI section.     DATA    Labs: No new labs available for review since last visit.   Lab Results   Component Value Date/Time    SODIUM 139 03/27/2023 12:22 PM    POTASSIUM 4.2 03/27/2023 12:22 PM    CHLORIDE 104 03/27/2023 12:22 PM    CO2 25 03/27/2023 12:22 PM    ANION 10.0 03/27/2023 12:22 PM    GLUCOSE 113 (H) 03/27/2023 12:22 PM    BUN 12 03/27/2023 12:22 PM    CREATININE 0.83 03/27/2023 12:22 PM    CREATININE 0.8 01/13/2005 09:50 PM    CALCIUM 9.0 03/27/2023 12:22 PM    ASTSGOT 18 03/27/2023 12:22 PM    ALTSGPT 31 03/27/2023 12:22 PM    TBILIRUBIN 0.4 03/27/2023 12:22 PM    ALBUMIN 4.4 03/27/2023 12:22 PM    TOTPROTEIN 7.6 03/27/2023 12:22 PM    GLOBULIN 3.2 03/27/2023 12:22 PM    AGRATIO 1.4 03/27/2023 12:22 PM       No results found for: \"PROTHROMBTM\", \"INR\"     Lab Results   Component Value Date/Time    WBC 11.8 (H) 05/31/2015 04:52 AM    RBC 3.67 (L) 05/31/2015 04:52 AM    HEMOGLOBIN 11.3 (L) 05/31/2015 04:52 AM    HEMATOCRIT 34.0 (L) 05/31/2015 04:52 AM    MCV 92.6 05/31/2015 04:52 AM    MCH 30.8 05/31/2015 04:52 AM    MCHC 33.2 (L) 05/31/2015 04:52 AM    MPV 11.7 05/31/2015 04:52 AM    NEUTSPOLYS 73.60 (H) 05/29/2015 10:20 AM    LYMPHOCYTES 20.00 (L) 05/29/2015 10:20 AM    MONOCYTES 5.50 05/29/2015 10:20 AM    EOSINOPHILS " 0.40 05/29/2015 10:20 AM    BASOPHILS 0.20 05/29/2015 10:20 AM        Lab Results   Component Value Date/Time    HBA1C 6.0 (H) 03/27/2023 12:22 PM        Imaging:   I personally reviewed following images, these are my reads  MRI lumbar spine 3/12/2024  At L5-S1 there is a left paracentral disc protrusion contributing to moderate effacement of the left lateral recess.  There is also abutment of the left S1 nerve at this level.  Appearance of hemangioma at L4.See formal radiology report for further details.          IMAGING radiology reads. I reviewed the following radiology reads                      Results for orders placed during the hospital encounter of 03/12/24    MR-LUMBAR SPINE-W/O    Impression  There is broad-based central and LEFT paracentral disc protrusion at L5-S1 causing moderate effacement of the LEFT lateral recess. The disc is abutting the exiting LEFT S1 nerve root at the lateral recesses.        Results for orders placed during the hospital encounter of 03/12/24    MR-LUMBAR SPINE-W/O    Impression  There is broad-based central and LEFT paracentral disc protrusion at L5-S1 causing moderate effacement of the LEFT lateral recess. The disc is abutting the exiting LEFT S1 nerve root at the lateral recesses.                                        Results for orders placed during the hospital encounter of 04/27/06    DX-CERVICAL SPINE-4+ VIEWS    Impression  IMPRESSION:    CERVICAL SPINE STRAIGHTENING WITH NO EVIDENCE OF FRACTURE.    RG:dxm                        Results for orders placed during the hospital encounter of 03/12/24    DX-LUMBAR SPINE-2 OR 3 VIEWS    Impression  Normal complete lumbar spine series.               Results for orders placed during the hospital encounter of 04/27/06    DX-THORACIC SPINE-2 VIEWS    Impression  IMPRESSION:    OVERALL UNREMARKABLE LIMITED THORACIC SPINE SERIES.    [facility]:dxm      Read By BETO LESLIE MD on Apr 27 2006  7:07PM  : DXM Transcription  Date: 2006 11:26PM  THIS DOCUMENT HAS BEEN ELECTRONICALLY SIGNED BY: BETO LESLIE MD on 2006  8:08AM            Diagnosis  Visit Diagnoses     ICD-10-CM   1. Lumbar radiculopathy  M54.16   2. Acute left-sided low back pain with left-sided sciatica  M54.42   3. Numbness and tingling of left leg  R20.0    R20.2   4. Left lumbar radiculopathy  M54.16   5. Left leg weakness  R29.898         ASSESSMENT AND PLAN:  Stew Maria (: 1985) is a female with pain radiating down the left leg with left leg weakness, consistent with left L5 and S1 lumbar radiculopathy given her positive left straight leg raise and slump test reproducing her pain.  Pain is severe, interfering with her ability to safely ambulate at this time.  She has recently fallen due to her pain.  For her safety, I believe she would benefit from an expedited epidural steroid injection to target her symptoms.     Stew was seen today for follow-up.    Diagnoses and all orders for this visit:    Lumbar radiculopathy  -     Referral to Pain Clinic    Acute left-sided low back pain with left-sided sciatica    Numbness and tingling of left leg    Left lumbar radiculopathy    Left leg weakness          PLAN  Physical Therapy: I previously ordered physical therapy to focus on strengthening and stretching as well as a home exercise program.  The patient is scheduled to start this in May 2024.     Home Exercise Program: I previously provided the patient with a home exercise program focusing on strengthening and stretching.      Diagnostic workup: Personally reviewed at today's visit:   MRI lumbar spine 3/12/2024     Medications:   - given the inflammatory component of pain, continue mobic as above.  Hold for 5 days prior to epidural steroid injection  - given the neuropathic component of pain, continue gabapentin 300 mg 3 times daily.  Consider up titration in the future.  -Okay to continue Flexeril     Interventions:   -Left L5-S1 and  S1 transforaminal epidural steroid injection. The risks, benefits, and alternatives to this procedure were discussed and the patient wishes to proceed with the procedure. Risks include but are not limited to damage to surrounding structures, infection, bleeding, worsening of pain which can be permanent, and weakness which can be permanent. Benefits include pain relief and improved function. Alternatives include not doing the procedure.      Follow-up: 4 weeks after injection, in person preferred    Orders Placed This Encounter    Referral to Pain Clinic       Alanna Mortensen MD  Interventional Pain and Spine  Physical Medicine and Rehabilitation  [facility]      The above note documents my personal evaluation of this patient. In addition, I have reviewed and confirmed with the patient and MA the supportive information documented in today's Patient Health Questionnaire and Office Note.     Please note that this dictation was created using voice recognition software. I have made every reasonable attempt to correct obvious errors, but I expect that there are errors of grammar and possibly content that I did not discover before finalizing the note.

## 2024-03-15 NOTE — H&P (VIEW-ONLY)
This encounter was conducted via secure encrypted technology using [employer] videoconferencing.   The patient was in a private location in the patient's home in the [facility]  Verbal consent was obtained. Patient's identity was verified.      Follow-up patient Note    Interventional Pain and Spine  Physiatry (Physical Medicine and Rehabilitation)     Patient Name: Stew Maria  : 1985  Date of service: 3/15/2024    Chief Complaint:   Chief Complaint   Patient presents with    Follow-Up     Acute left-sided low back pain with left-sided sciatica           HISTORY FROM INITIAL VISIT (2024):  Stew Maria is a 38 y.o. female who presents today with low back pain radiating down the left leg.  Started 10 days ago with no known inciting event.  Feels like a fluctuating aching sharp pain.  She also endorses numbness and burning at her left posterolateral thigh and her left lateral calf and heel. Endorses weakness in her left leg.  Pain interferes with her ability to walk, or stand or sit for a prolonged time.      Pain right now is 10/10 on the numeric pain scale. Her pain at its best-worse level during the course of the day is typically 9-10/10, respectively.  Pain worsens with walking, bending forward, bending backwards, walking upstairs, walking downstairs, coughing, and sneezing and improves with nothing. Her pain significantly interferes with ADLs. The patient otherwise denies radiating pain, new focal weakness, numbness, or bladder/bowel incontinence. She reports having fluctuating left sciatica for years.  She has managed this with chiropractic therapy.       The patient has not done a provider driven physical therapy program for this problem.     Patient has tried the following medications with varied success (current meds in bold):   prednisone   flexeril as prescribed by UC - no significant improvement  Ibuprofen 1000 mg-no improvement     Therapeutic modalities and  "interventional therapies to date include:  -No injections    HPI  Today's visit   Stew Maria ( 1985) is a female with Diagnoses of Lumbar radiculopathy, Acute left-sided low back pain with left-sided sciatica, Numbness and tingling of left leg, Left lumbar radiculopathy, and Left leg weakness were pertinent to this visit.    Still having pain radiating down her left leg with sensation of \"heat going through the foot.\" Reports ongoing numbness in her left leg and foot. Also with jose horse feeling in her leg. Fluctuates. Endorses weakness in her left foot. Fell last week when she liliana from the toilet and her left foot gave out. Notes that mobic improves the pain.  Taking gabapentin 300 mg 3 times daily with possibly some relief as well.  Pain is severe, interfering with ADLs.    Won't be able to start PT until May. Doing provider driven home exercise program in the meantime.     Pt denies new numbness, tingling, or weakness.      ROS:   Red Flags ROS:   Fever, Chills, Sweats: Denies  Involuntary Weight Loss: Denies  Bladder Incontinence: Denies  Bowel Incontinence: denies  Saddle Anesthesia: Denies    All other systems reviewed and negative.     PMHx:   History reviewed. No pertinent past medical history.    PSHx:   Past Surgical History:   Procedure Laterality Date    LEEP         Family Hx:   History reviewed. No pertinent family history.    Social Hx:  Social History     Socioeconomic History    Marital status:      Spouse name: Not on file    Number of children: Not on file    Years of education: Not on file    Highest education level: Not on file   Occupational History    Not on file   Tobacco Use    Smoking status: Never    Smokeless tobacco: Never   Vaping Use    Vaping Use: Never used   Substance and Sexual Activity    Alcohol use: Yes     Comment: approx 1 per wk    Drug use: No    Sexual activity: Not on file   Other Topics Concern    Not on file   Social History Narrative    " Not on file     Social Determinants of Health     Financial Resource Strain: Not on file   Food Insecurity: Not on file   Transportation Needs: Not on file   Physical Activity: Not on file   Stress: Not on file   Social Connections: Not on file   Intimate Partner Violence: Not on file   Housing Stability: Not on file       Allergies:  Allergies   Allergen Reactions    Anti-Itch        Medications: reviewed on epic.   Outpatient Medications Marked as Taking for the 3/15/24 encounter (Telemedicine) with Alanna Mortensen M.D.   Medication Sig Dispense Refill    gabapentin (NEURONTIN) 300 MG Cap Take 300mg at bedtime x 1 week, then 300mg BID x 1 week, then 300mg TID thereafter 90 Capsule 2    meloxicam (MOBIC) 15 MG tablet Take 1 Tablet by mouth 1 time a day as needed for Moderate Pain or Severe Pain for up to 30 days. Take with food. Do not take with other NSAIDs 30 Tablet 0    WELLBUTRIN  MG XL tablet       DULoxetine (CYMBALTA) 60 MG Cap DR Particles delayed-release capsule       spironolactone (ALDACTONE) 25 MG Tab Take 25 mg by mouth every day.          Current Outpatient Medications on File Prior to Visit   Medication Sig Dispense Refill    gabapentin (NEURONTIN) 300 MG Cap Take 300mg at bedtime x 1 week, then 300mg BID x 1 week, then 300mg TID thereafter 90 Capsule 2    meloxicam (MOBIC) 15 MG tablet Take 1 Tablet by mouth 1 time a day as needed for Moderate Pain or Severe Pain for up to 30 days. Take with food. Do not take with other NSAIDs 30 Tablet 0    WELLBUTRIN  MG XL tablet       DULoxetine (CYMBALTA) 60 MG Cap DR Particles delayed-release capsule       spironolactone (ALDACTONE) 25 MG Tab Take 25 mg by mouth every day.      ZEPBOUND 2.5 MG/0.5ML Solution Auto-injector       predniSONE (DELTASONE) 20 MG Tab 2 tabs po daily x 5 days. 10 Tablet 0    cyclobenzaprine (FLEXERIL) 10 mg Tab Take 1 Tablet by mouth 3 times a day as needed for Moderate Pain or Muscle Spasms. 30 Tablet 0    lidocaine  (LIDODERM) 5 % Patch Place 3 Patches on the skin every 12 hours. Up to 3 patches every 12 hours. Must remove patches after 12 hours. 30 Patch 0    albuterol 108 (90 Base) MCG/ACT Aero Soln inhalation aerosol Inhale 2 Puffs every 6 hours as needed for Shortness of Breath. 8.5 g 1     No current facility-administered medications on file prior to visit.         EXAMINATION     Physical Exam: Limited due to video visit  There were no vitals taken for this visit.    Constitutional:   Body Habitus: There is no height or weight on file to calculate BMI.  Cooperation: Fully cooperates with exam  Appearance: Well-groomed, well-nourished.  Gen: no acute distress  HEENT: NCAT, EOMI  Resp: breathing comfortably on RA    Previous exam  Eyes: No scleral icterus to suggest severe liver disease, no proptosis to suggest severe hyperthyroidism    ENT -no obvious auditory deficits, no noticeable facial droop     Skin -no rashes or lesions noted     Respiratory-  breathing comfortably on room air, no audible wheezing    Cardiovascular-distal extremities warm and well perfused.  No lower extremity edema is noted.     Gastrointestinal - no obvious abdominal masses, non-distended    Psychiatric- alert and oriented ×3. Normal affect.     Gait -antalgic gait favoring left leg.  Unable to perform heel walking and toe walking due to pain.       Musculoskeletal and Neuro -      Thoracic/Lumbar Spine/Sacral Spine/Hips   Inspection: No evidence of atrophy in bilateral lower extremities throughout      There is limited active range of motion with lumbar extension     Facet loading maneuver negative bilaterally     Palpation:   Tenderness to palpation over the midline of lumbosacral spine, paraspinal muscles bilaterally, lumbar facets bilaterally spanning approximately L1-L5 levels, and bilateral glutes .      Lumbar spine /hip provocative exam maneuvers  Straight leg raise positive on left, negative on right  FADIR test negative  "bilaterally  Slump-sit test positive on left, negative on right     SI joint tests  PAULO test negative bilaterally     Key points for the international standards for neurological classification of spinal cord injury (ISNCSCI) to light touch.   Dermatome R L   L2 2 2   L3 2 2   L4 2 1   L5 2 1   S1 2 1   S2 2 2         Motor Exam Lower Extremities  ? Myotome R L   Hip flexion L2 5 4*    Knee extension L3 5 5   Ankle dorsiflexion L4 5 4*    Toe extension L5 5 5   Ankle plantarflexion S1 5 5   *Pain limited     Reflexes  ?   R L   Patella   2+ 2+   Achilles    2+ 2+      Clonus of the ankle negative right, positive left          MEDICAL DECISION MAKING    Medical records review: see under HPI section.     DATA    Labs: No new labs available for review since last visit.   Lab Results   Component Value Date/Time    SODIUM 139 03/27/2023 12:22 PM    POTASSIUM 4.2 03/27/2023 12:22 PM    CHLORIDE 104 03/27/2023 12:22 PM    CO2 25 03/27/2023 12:22 PM    ANION 10.0 03/27/2023 12:22 PM    GLUCOSE 113 (H) 03/27/2023 12:22 PM    BUN 12 03/27/2023 12:22 PM    CREATININE 0.83 03/27/2023 12:22 PM    CREATININE 0.8 01/13/2005 09:50 PM    CALCIUM 9.0 03/27/2023 12:22 PM    ASTSGOT 18 03/27/2023 12:22 PM    ALTSGPT 31 03/27/2023 12:22 PM    TBILIRUBIN 0.4 03/27/2023 12:22 PM    ALBUMIN 4.4 03/27/2023 12:22 PM    TOTPROTEIN 7.6 03/27/2023 12:22 PM    GLOBULIN 3.2 03/27/2023 12:22 PM    AGRATIO 1.4 03/27/2023 12:22 PM       No results found for: \"PROTHROMBTM\", \"INR\"     Lab Results   Component Value Date/Time    WBC 11.8 (H) 05/31/2015 04:52 AM    RBC 3.67 (L) 05/31/2015 04:52 AM    HEMOGLOBIN 11.3 (L) 05/31/2015 04:52 AM    HEMATOCRIT 34.0 (L) 05/31/2015 04:52 AM    MCV 92.6 05/31/2015 04:52 AM    MCH 30.8 05/31/2015 04:52 AM    MCHC 33.2 (L) 05/31/2015 04:52 AM    MPV 11.7 05/31/2015 04:52 AM    NEUTSPOLYS 73.60 (H) 05/29/2015 10:20 AM    LYMPHOCYTES 20.00 (L) 05/29/2015 10:20 AM    MONOCYTES 5.50 05/29/2015 10:20 AM    EOSINOPHILS " 0.40 05/29/2015 10:20 AM    BASOPHILS 0.20 05/29/2015 10:20 AM        Lab Results   Component Value Date/Time    HBA1C 6.0 (H) 03/27/2023 12:22 PM        Imaging:   I personally reviewed following images, these are my reads  MRI lumbar spine 3/12/2024  At L5-S1 there is a left paracentral disc protrusion contributing to moderate effacement of the left lateral recess.  There is also abutment of the left S1 nerve at this level.  Appearance of hemangioma at L4.See formal radiology report for further details.          IMAGING radiology reads. I reviewed the following radiology reads                      Results for orders placed during the hospital encounter of 03/12/24    MR-LUMBAR SPINE-W/O    Impression  There is broad-based central and LEFT paracentral disc protrusion at L5-S1 causing moderate effacement of the LEFT lateral recess. The disc is abutting the exiting LEFT S1 nerve root at the lateral recesses.        Results for orders placed during the hospital encounter of 03/12/24    MR-LUMBAR SPINE-W/O    Impression  There is broad-based central and LEFT paracentral disc protrusion at L5-S1 causing moderate effacement of the LEFT lateral recess. The disc is abutting the exiting LEFT S1 nerve root at the lateral recesses.                                        Results for orders placed during the hospital encounter of 04/27/06    DX-CERVICAL SPINE-4+ VIEWS    Impression  IMPRESSION:    CERVICAL SPINE STRAIGHTENING WITH NO EVIDENCE OF FRACTURE.    RG:dxm                        Results for orders placed during the hospital encounter of 03/12/24    DX-LUMBAR SPINE-2 OR 3 VIEWS    Impression  Normal complete lumbar spine series.               Results for orders placed during the hospital encounter of 04/27/06    DX-THORACIC SPINE-2 VIEWS    Impression  IMPRESSION:    OVERALL UNREMARKABLE LIMITED THORACIC SPINE SERIES.    [facility]:dxm      Read By BETO LESLIE MD on Apr 27 2006  7:07PM  : DXM Transcription  Date: 2006 11:26PM  THIS DOCUMENT HAS BEEN ELECTRONICALLY SIGNED BY: BETO LESLIE MD on 2006  8:08AM            Diagnosis  Visit Diagnoses     ICD-10-CM   1. Lumbar radiculopathy  M54.16   2. Acute left-sided low back pain with left-sided sciatica  M54.42   3. Numbness and tingling of left leg  R20.0    R20.2   4. Left lumbar radiculopathy  M54.16   5. Left leg weakness  R29.898         ASSESSMENT AND PLAN:  Stew Maria (: 1985) is a female with pain radiating down the left leg with left leg weakness, consistent with left L5 and S1 lumbar radiculopathy given her positive left straight leg raise and slump test reproducing her pain.  Pain is severe, interfering with her ability to safely ambulate at this time.  She has recently fallen due to her pain.  For her safety, I believe she would benefit from an expedited epidural steroid injection to target her symptoms.     Stew was seen today for follow-up.    Diagnoses and all orders for this visit:    Lumbar radiculopathy  -     Referral to Pain Clinic    Acute left-sided low back pain with left-sided sciatica    Numbness and tingling of left leg    Left lumbar radiculopathy    Left leg weakness          PLAN  Physical Therapy: I previously ordered physical therapy to focus on strengthening and stretching as well as a home exercise program.  The patient is scheduled to start this in May 2024.     Home Exercise Program: I previously provided the patient with a home exercise program focusing on strengthening and stretching.      Diagnostic workup: Personally reviewed at today's visit:   MRI lumbar spine 3/12/2024     Medications:   - given the inflammatory component of pain, continue mobic as above.  Hold for 5 days prior to epidural steroid injection  - given the neuropathic component of pain, continue gabapentin 300 mg 3 times daily.  Consider up titration in the future.  -Okay to continue Flexeril     Interventions:   -Left L5-S1 and  S1 transforaminal epidural steroid injection. The risks, benefits, and alternatives to this procedure were discussed and the patient wishes to proceed with the procedure. Risks include but are not limited to damage to surrounding structures, infection, bleeding, worsening of pain which can be permanent, and weakness which can be permanent. Benefits include pain relief and improved function. Alternatives include not doing the procedure.      Follow-up: 4 weeks after injection, in person preferred    Orders Placed This Encounter    Referral to Pain Clinic       Alanna Mortensen MD  Interventional Pain and Spine  Physical Medicine and Rehabilitation  [facility]      The above note documents my personal evaluation of this patient. In addition, I have reviewed and confirmed with the patient and MA the supportive information documented in today's Patient Health Questionnaire and Office Note.     Please note that this dictation was created using voice recognition software. I have made every reasonable attempt to correct obvious errors, but I expect that there are errors of grammar and possibly content that I did not discover before finalizing the note.

## 2024-03-22 ENCOUNTER — HOSPITAL ENCOUNTER (OUTPATIENT)
Dept: RADIOLOGY | Facility: REHABILITATION | Age: 39
End: 2024-03-22
Attending: STUDENT IN AN ORGANIZED HEALTH CARE EDUCATION/TRAINING PROGRAM

## 2024-03-22 ENCOUNTER — HOSPITAL ENCOUNTER (OUTPATIENT)
Facility: REHABILITATION | Age: 39
End: 2024-03-22
Attending: STUDENT IN AN ORGANIZED HEALTH CARE EDUCATION/TRAINING PROGRAM | Admitting: STUDENT IN AN ORGANIZED HEALTH CARE EDUCATION/TRAINING PROGRAM
Payer: COMMERCIAL

## 2024-03-22 VITALS
WEIGHT: 236.33 LBS | HEIGHT: 64 IN | SYSTOLIC BLOOD PRESSURE: 117 MMHG | HEART RATE: 85 BPM | OXYGEN SATURATION: 94 % | RESPIRATION RATE: 16 BRPM | TEMPERATURE: 98.1 F | BODY MASS INDEX: 40.35 KG/M2 | DIASTOLIC BLOOD PRESSURE: 67 MMHG

## 2024-03-22 PROCEDURE — 64484 NJX AA&/STRD TFRM EPI L/S EA: CPT

## 2024-03-22 PROCEDURE — 700111 HCHG RX REV CODE 636 W/ 250 OVERRIDE (IP): Mod: JZ

## 2024-03-22 PROCEDURE — 700117 HCHG RX CONTRAST REV CODE 255

## 2024-03-22 PROCEDURE — 64483 NJX AA&/STRD TFRM EPI L/S 1: CPT

## 2024-03-22 RX ORDER — LIDOCAINE HYDROCHLORIDE 10 MG/ML
INJECTION, SOLUTION EPIDURAL; INFILTRATION; INTRACAUDAL; PERINEURAL
Status: COMPLETED
Start: 2024-03-22 | End: 2024-03-22

## 2024-03-22 RX ORDER — DEXAMETHASONE SODIUM PHOSPHATE 10 MG/ML
INJECTION, SOLUTION INTRAMUSCULAR; INTRAVENOUS
Status: COMPLETED
Start: 2024-03-22 | End: 2024-03-22

## 2024-03-22 RX ADMIN — DEXAMETHASONE SODIUM PHOSPHATE 10 MG: 10 INJECTION, SOLUTION INTRAMUSCULAR; INTRAVENOUS at 09:15

## 2024-03-22 RX ADMIN — IOHEXOL 5 ML: 240 INJECTION, SOLUTION INTRATHECAL; INTRAVASCULAR; INTRAVENOUS; ORAL at 09:15

## 2024-03-22 RX ADMIN — LIDOCAINE HYDROCHLORIDE 10 ML: 10 INJECTION, SOLUTION EPIDURAL; INFILTRATION; INTRACAUDAL; PERINEURAL at 09:15

## 2024-03-22 ASSESSMENT — PAIN DESCRIPTION - PAIN TYPE: TYPE: CHRONIC PAIN

## 2024-03-22 NOTE — PROGRESS NOTES
0840 Pt arrived to pre-procedure area. Procedure & plan for recovery reviewed, site confirmed, & consent signed. VSS. Allergies & current medications verified. Appointed  waiting in car. Printed discharge instructions discussed & signed. Pt denies taking NSAIDS or anticoagulants in the last 5 days. MD to bedside prior to procedure.     0946 Pt to recovery area s/p TESI & updates received from procedure RN. VSS. Pt reports pain at procedural site with some numbness of L leg. Ice packs given for home care. Dr. Mortensen to bedside for post-procedure evaluation.      0916 DC criteria met. RN assisted pt off unit via WC to appointed .

## 2024-03-22 NOTE — OR SURGEON
Immediate Post OP Note    Pre-Op Diagnosis Codes:     * Lumbar radiculopathy [M54.16]      Post-Op Diagnosis Codes:     * Lumbar radiculopathy [M54.16]      Procedure(s):  LEFT L5-S1 and S1 transforaminal epidural steroid injection with fluoroscopic guidance - Wound Class: Clean    Surgeon(s):  Alanna Mortensen M.D.    Anesthesiologist/Type of Anesthesia:  No anesthesia staff entered./Local    Surgical Staff:  Circulator: Britney Landeros R.N.  Scrub Person: Raine Quiroga  Radiology Technologist: Raad Raphael    Specimens removed if any:  * No specimens in log *    Estimated Blood Loss: None    Findings: None    Complications: None        3/22/2024 9:25 AM Alanna Mortensen M.D.

## 2024-03-22 NOTE — OP REPORT
Date of Service: 3/22/2024     Patient: Stew Maria 38 y.o. female     MRN: [id number]     Physician/s: Alanna Mortensen MD    Pre-operative Diagnosis: Lumbar radiculopathy    Post-operative Diagnosis: Lumbar radiculopathy    Procedure: Lumbar Transforaminal Epidural Steroid Injection at the  left  L5-S1 and S1 levels.     Description of procedure:    The risks, benefits, and alternatives of the procedure were reviewed and discussed with the patient.  Written informed consent was freely obtained. A pre-procedural time-out was conducted by the physician verifying patient’s identity, procedure to be performed, procedure site and side, and allergy verification. Appropriate equipment was determined to be in place for the procedure.     The patient's vital signs were carefully monitored before, throughout, and after the procedure.     In the fluoroscopy suite the patient was placed in a prone position, a pillow placed underneath their umbilicus. The skin was prepped and draped in the usual sterile fashion.     The fluoroscope was placed over the lumbar spine and adjusted into the proper AP/Oblique view to enter the transforaminal space just adjacent to the pedicle at the levels below. The targets for injection were then marked at the left L5-S1 and S1. A 27g 1.5 inch needle was placed into the marked site, and approximately 1mL of 1% Lidocaine was injected subcutaneously into the epidermal and dermal layers. The needle was removed intact.  A 22g 5 inch spinal needle was then placed and advanced under fluoroscopic guidance in an oblique view towards the epidural space of the levels noted above. The needle position was confirmed to not be past the 6 o'clock position in the AP view and it was in the neuroforamen in the lateral view.     Under live fluoroscopic guidance in the AP view, contrast dye was used to highlight the epidural space spread of each level above. Final fluoroscopic images were saved.  Following  negative aspiration, 1.5mL of 1% lidocaine preservative free with 0.5mL of 10mg/mL of dexamethasone was then injected at each level above, and the needles were removed intact after being restyleted. The patient's back was covered with a 4x4 gauze, the area was cleansed with sterile normal saline, and a dressing was applied. There were no complications noted.     The patient was then evaluated post-procedure, and was hemodynamically stable prior to leaving the post-operative care unit.     Follow-up as scheduled    Alanna Mortensen MD  Interventional Pain and Spine  Physical Medicine and Rehabilitation  [facility]    Pain score prior to injection: 6/10 on NRS  Pain score immediately after injection: 6/10 on NRS    CPT codes  Transforaminal epidural injection- lumbar or sacral (first level):  56754  Transforaminal epidural injection- lumbar or sacral (each additional level):  00532

## 2024-03-22 NOTE — INTERVAL H&P NOTE
H&P reviewed. The patient was examined and there are no changes to the H&P    Alanna Mortensen MD  Interventional Pain and Spine  Physical Medicine and Rehabilitation  [facility]

## 2024-04-18 NOTE — PROGRESS NOTES
Follow-up patient Note    Interventional Pain and Spine  Physiatry (Physical Medicine and Rehabilitation)     Patient Name: Stew Maria  : 1985  Date of service: 2024    Chief Complaint:   Chief Complaint   Patient presents with    Follow-Up     Acute left-sided low back pain with left-sided sciatica           HISTORY FROM INITIAL VISIT (2024):  Stew Maria is a 38 y.o. female who presents today with low back pain radiating down the left leg.  Started 10 days ago with no known inciting event.  Feels like a fluctuating aching sharp pain.  She also endorses numbness and burning at her left posterolateral thigh and her left lateral calf and heel. Endorses weakness in her left leg.  Pain interferes with her ability to walk, or stand or sit for a prolonged time.      Pain right now is 10/10 on the numeric pain scale. Her pain at its best-worse level during the course of the day is typically 9-10/10, respectively.  Pain worsens with walking, bending forward, bending backwards, walking upstairs, walking downstairs, coughing, and sneezing and improves with nothing. Her pain significantly interferes with ADLs. The patient otherwise denies radiating pain, new focal weakness, numbness, or bladder/bowel incontinence. She reports having fluctuating left sciatica for years.  She has managed this with chiropractic therapy.       The patient has not done a provider driven physical therapy program for this problem.     Patient has tried the following medications with varied success (current meds in bold):   prednisone   flexeril as prescribed by [facility] - no significant improvement  Ibuprofen 1000 mg-no improvement     Therapeutic modalities and interventional therapies to date include:  -No injections    HPI  Today's visit   Stew Maria ( 1985) is a female with Diagnoses of Lumbar radiculopathy and Left lumbar radiculopathy were pertinent to this visit.    Presents today after   3/22/24 Left L5-S1 and S1 transforaminal epidural steroid injection with improvement in back pain and resolution of sharp radiating pain from her left low back to her left foot.  She reports ongoing dull pain and numbness and tingling in her foot. Rates 6/10 on NRS today. Interferes with ADLs including prolonged sitting or standing.  Worse with bearing weight on her left foot.  Still having weakness in her foot.     She reports taking tylenol or ibuprofen 1000mg QAM and possibly 400-600mg . Takes gabapentin PRN, can't take it more often because it can interfere with work.     Starting PT next month. Doing provider driven home exercise program in the meantime.     Pt denies new numbness, tingling, or weakness.    Procedure history:  - 3/22/24 Left L5-S1 and S1 transforaminal epidural steroid injection -partial improvement        ROS:   Red Flags ROS:   Fever, Chills, Sweats: Denies  Involuntary Weight Loss: Denies  Bladder Incontinence: Denies  Bowel Incontinence: denies  Saddle Anesthesia: Denies    All other systems reviewed and negative.     PMHx:   History reviewed. No pertinent past medical history.    PSHx:   Past Surgical History:   Procedure Laterality Date    BLOCK EPIDURAL STEROID INJECTION Left 3/22/2024    Procedure: LEFT L5-S1 and S1 transforaminal epidural steroid injection with fluoroscopic guidance;  Surgeon: Alanna Mortensen M.D.;  [facility];  Service: Pain Management    [facility]         Family Hx:   History reviewed. No pertinent family history.    Social Hx:  Social History     Socioeconomic History    Marital status:      Spouse name: Not on file    Number of children: Not on file    Years of education: Not on file    Highest education level: Not on file   Occupational History    Not on file   Tobacco Use    Smoking status: Never    Smokeless tobacco: Never   Vaping Use    Vaping Use: Never used   Substance and Sexual Activity    Alcohol use: Yes     Comment: approx  1 per wk    Drug use: No    Sexual activity: Not on file   Other Topics Concern    Not on file   Social History Narrative    Not on file     Social Determinants of Health     Financial Resource Strain: Not on file   Food Insecurity: Not on file   Transportation Needs: Not on file   Physical Activity: Not on file   Stress: Not on file   Social Connections: Not on file   Intimate Partner Violence: Not on file   Housing Stability: Not on file       Allergies:  Allergies   Allergen Reactions    Anti-Itch        Medications: reviewed on epic.   Outpatient Medications Marked as Taking for the 4/19/24 encounter (Office Visit) with Alanna Mortensen M.D.   Medication Sig Dispense Refill    gabapentin (NEURONTIN) 300 MG Cap Take 300mg at bedtime x 1 week, then 300mg BID x 1 week, then 300mg TID thereafter 90 Capsule 2    WELLBUTRIN  MG XL tablet       DULoxetine (CYMBALTA) 60 MG Cap DR Particles delayed-release capsule       spironolactone (ALDACTONE) 25 MG Tab Take 25 mg by mouth every day.          Current Outpatient Medications on File Prior to Visit   Medication Sig Dispense Refill    gabapentin (NEURONTIN) 300 MG Cap Take 300mg at bedtime x 1 week, then 300mg BID x 1 week, then 300mg TID thereafter 90 Capsule 2    WELLBUTRIN  MG XL tablet       DULoxetine (CYMBALTA) 60 MG Cap DR Particles delayed-release capsule       spironolactone (ALDACTONE) 25 MG Tab Take 25 mg by mouth every day.      ZEPBOUND 2.5 MG/0.5ML Solution Auto-injector       predniSONE (DELTASONE) 20 MG Tab 2 tabs po daily x 5 days. 10 Tablet 0    cyclobenzaprine (FLEXERIL) 10 mg Tab Take 1 Tablet by mouth 3 times a day as needed for Moderate Pain or Muscle Spasms. 30 Tablet 0    lidocaine (LIDODERM) 5 % Patch Place 3 Patches on the skin every 12 hours. Up to 3 patches every 12 hours. Must remove patches after 12 hours. 30 Patch 0    albuterol 108 (90 Base) MCG/ACT Aero Soln inhalation aerosol Inhale 2 Puffs every 6 hours as needed for Shortness  "of Breath. 8.5 g 1     No current facility-administered medications on file prior to visit.         EXAMINATION     Physical Exam: Limited due to video visit  /72 (BP Location: Right arm, Patient Position: Sitting, BP Cuff Size: Adult)   Pulse 65   Temp 36.8 °C (98.3 °F) (Temporal)   Ht 1.626 m (5' 4\")   Wt 105 kg (230 lb 13.2 oz)   SpO2 100%     Constitutional:   Body Habitus: Body mass index is 39.62 kg/m².  Cooperation: Fully cooperates with exam  Appearance: Well-groomed, well-nourished.  Gen: no acute distress  HEENT: NCAT, EOMI  Resp: breathing comfortably on RA    Previous exam  Eyes: No scleral icterus to suggest severe liver disease, no proptosis to suggest severe hyperthyroidism    ENT -no obvious auditory deficits, no noticeable facial droop     Skin -no rashes or lesions noted     Respiratory-  breathing comfortably on room air, no audible wheezing    Cardiovascular-distal extremities warm and well perfused.  No lower extremity edema is noted.     Gastrointestinal - no obvious abdominal masses, non-distended    Psychiatric- alert and oriented ×3. Normal affect.     Gait -antalgic gait favoring left leg.       Musculoskeletal and Neuro -      Thoracic/Lumbar Spine/Sacral Spine/Hips   Inspection: No evidence of atrophy in bilateral lower extremities throughout      There is limited active range of motion with lumbar extension     Facet loading maneuver negative bilaterally     Palpation:   Tenderness to palpation over the midline of lumbosacral spine, paraspinal muscles bilaterally, lumbar facets bilaterally spanning approximately L1-L5 levels, and bilateral glutes .      Lumbar spine /hip provocative exam maneuvers  Straight leg raise positive on left, negative on right  FADIR test negative bilaterally     SI joint tests  PAULO test negative bilaterally     Key points for the international standards for neurological classification of spinal cord injury (ISNCSCI) to light touch.   Dermatome R L " "  L2 2 2   L3 2 2   L4 2 2   L5 2 1   S1 2 1   S2 2 2         Motor Exam Lower Extremities  ? Myotome R L   Hip flexion L2 5 4*    Knee extension L3 5 5   Ankle dorsiflexion L4 5 45   Toe extension L5 5 5   Ankle plantarflexion S1 5 5   *Pain limited     Previous exam  Reflexes  ?   R L   Patella   2+ 2+   Achilles    2+ 2+      Clonus of the ankle negative right, positive left          MEDICAL DECISION MAKING    Medical records review: see under HPI section.     DATA    Labs: No new labs available for review since last visit.   Lab Results   Component Value Date/Time    SODIUM 139 03/27/2023 12:22 PM    POTASSIUM 4.2 03/27/2023 12:22 PM    CHLORIDE 104 03/27/2023 12:22 PM    CO2 25 03/27/2023 12:22 PM    ANION 10.0 03/27/2023 12:22 PM    GLUCOSE 113 (H) 03/27/2023 12:22 PM    BUN 12 03/27/2023 12:22 PM    CREATININE 0.83 03/27/2023 12:22 PM    CREATININE 0.8 01/13/2005 09:50 PM    CALCIUM 9.0 03/27/2023 12:22 PM    ASTSGOT 18 03/27/2023 12:22 PM    ALTSGPT 31 03/27/2023 12:22 PM    TBILIRUBIN 0.4 03/27/2023 12:22 PM    ALBUMIN 4.4 03/27/2023 12:22 PM    TOTPROTEIN 7.6 03/27/2023 12:22 PM    GLOBULIN 3.2 03/27/2023 12:22 PM    AGRATIO 1.4 03/27/2023 12:22 PM       No results found for: \"PROTHROMBTM\", \"INR\"     Lab Results   Component Value Date/Time    WBC 11.8 (H) 05/31/2015 04:52 AM    RBC 3.67 (L) 05/31/2015 04:52 AM    HEMOGLOBIN 11.3 (L) 05/31/2015 04:52 AM    HEMATOCRIT 34.0 (L) 05/31/2015 04:52 AM    MCV 92.6 05/31/2015 04:52 AM    MCH 30.8 05/31/2015 04:52 AM    MCHC 33.2 (L) 05/31/2015 04:52 AM    MPV 11.7 05/31/2015 04:52 AM    NEUTSPOLYS 73.60 (H) 05/29/2015 10:20 AM    LYMPHOCYTES 20.00 (L) 05/29/2015 10:20 AM    MONOCYTES 5.50 05/29/2015 10:20 AM    EOSINOPHILS 0.40 05/29/2015 10:20 AM    BASOPHILS 0.20 05/29/2015 10:20 AM        Lab Results   Component Value Date/Time    HBA1C 6.0 (H) 03/27/2023 12:22 PM        Imaging:   I personally reviewed following images, these are my reads  MRI lumbar spine " 3/12/2024  At L5-S1 there is a left paracentral disc protrusion contributing to moderate effacement of the left lateral recess.  There is also abutment of the left S1 nerve at this level.  Appearance of hemangioma at L4.See formal radiology report for further details.          IMAGING radiology reads. I reviewed the following radiology reads                      Results for orders placed during the hospital encounter of 03/12/24    MR-LUMBAR SPINE-W/O    Impression  There is broad-based central and LEFT paracentral disc protrusion at L5-S1 causing moderate effacement of the LEFT lateral recess. The disc is abutting the exiting LEFT S1 nerve root at the lateral recesses.        Results for orders placed during the hospital encounter of 03/12/24    MR-LUMBAR SPINE-W/O    Impression  There is broad-based central and LEFT paracentral disc protrusion at L5-S1 causing moderate effacement of the LEFT lateral recess. The disc is abutting the exiting LEFT S1 nerve root at the lateral recesses.                                        Results for orders placed during the hospital encounter of 04/27/06    DX-CERVICAL SPINE-4+ VIEWS    Impression  IMPRESSION:    CERVICAL SPINE STRAIGHTENING WITH NO EVIDENCE OF FRACTURE.    RG:dxm                        Results for orders placed during the hospital encounter of 03/12/24    DX-LUMBAR SPINE-2 OR 3 VIEWS    Impression  Normal complete lumbar spine series.               Results for orders placed during the hospital encounter of 04/27/06    DX-THORACIC SPINE-2 VIEWS    Impression  IMPRESSION:    OVERALL UNREMARKABLE LIMITED THORACIC SPINE SERIES.    RG:dxm      Read By BETO LESLIE MD on Apr 27 2006  7:07PM  : DXM Transcription Date: Apr 27 2006 11:26PM  THIS DOCUMENT HAS BEEN ELECTRONICALLY SIGNED BY: BETO LESLIE MD on Apr 28 2006  8:08AM            Diagnosis  Visit Diagnoses     ICD-10-CM   1. Lumbar radiculopathy  M54.16   2. Left lumbar radiculopathy  M54.16            ASSESSMENT AND PLAN:  Stew Maria (: 1985) is a female with pain radiating down the left leg with left leg weakness, consistent with left L5 and S1 lumbar radiculopathy given her positive left straight leg raise test reproducing her pain.  Pain is severe and continues to interfere with her ability to safely ambulate at this time.  She has fallen due to her pain.  For her safety, I believe she would benefit from an expedited epidural steroid injection to target her symptoms.     Stew was seen today for follow-up.    Diagnoses and all orders for this visit:    Lumbar radiculopathy    Left lumbar radiculopathy  -     Referral to Pain Clinic            PLAN  Physical Therapy: I previously ordered physical therapy to focus on strengthening and stretching as well as a home exercise program.  The patient is scheduled to start this in May 2024.     Home Exercise Program: I previously provided the patient with a home exercise program focusing on strengthening and stretching.      Diagnostic workup: Again personally reviewed at today's visit:   MRI lumbar spine 3/12/2024     Medications:   -The patient discontinued Mobic and notes no increased improvement with Mobic compared to ibuprofen.  Discussed that  I will would recommend that she limit ibuprofen to 800 mg at once for her safety  - given the neuropathic component of pain, continue gabapentin as needed.  Unfortunately she is unable to take this more frequently on a scheduled basis due to side effects interfering with her work  -Okay to continue Flexeril     Interventions:   -Repeat left L5-S1 and S1 transforaminal epidural steroid injection given partial improvement with this injection in the past and possible augmented improvement with a second epidural steroid injection targeting these levels. The risks, benefits, and alternatives to this procedure were discussed and the patient wishes to proceed with the procedure. Risks include but are  not limited to damage to surrounding structures, infection, bleeding, worsening of pain which can be permanent, and weakness which can be permanent. Benefits include pain relief and improved function. Alternatives include not doing the procedure.      Follow-up: 4 weeks after injection, virtual okay    Orders Placed This Encounter    Referral to Pain Clinic       Alanna Mortensen MD  Interventional Pain and Spine  Physical Medicine and Rehabilitation  [facility]      The above note documents my personal evaluation of this patient. In addition, I have reviewed and confirmed with the patient and MA the supportive information documented in today's Patient Health Questionnaire and Office Note.     Please note that this dictation was created using voice recognition software. I have made every reasonable attempt to correct obvious errors, but I expect that there are errors of grammar and possibly content that I did not discover before finalizing the note.

## 2024-04-18 NOTE — H&P (VIEW-ONLY)
Follow-up patient Note    Interventional Pain and Spine  Physiatry (Physical Medicine and Rehabilitation)     Patient Name: Stew Maria  : 1985  Date of service: 2024    Chief Complaint:   Chief Complaint   Patient presents with    Follow-Up     Acute left-sided low back pain with left-sided sciatica           HISTORY FROM INITIAL VISIT (2024):  Stew Maria is a 38 y.o. female who presents today with low back pain radiating down the left leg.  Started 10 days ago with no known inciting event.  Feels like a fluctuating aching sharp pain.  She also endorses numbness and burning at her left posterolateral thigh and her left lateral calf and heel. Endorses weakness in her left leg.  Pain interferes with her ability to walk, or stand or sit for a prolonged time.      Pain right now is 10/10 on the numeric pain scale. Her pain at its best-worse level during the course of the day is typically 9-10/10, respectively.  Pain worsens with walking, bending forward, bending backwards, walking upstairs, walking downstairs, coughing, and sneezing and improves with nothing. Her pain significantly interferes with ADLs. The patient otherwise denies radiating pain, new focal weakness, numbness, or bladder/bowel incontinence. She reports having fluctuating left sciatica for years.  She has managed this with chiropractic therapy.       The patient has not done a provider driven physical therapy program for this problem.     Patient has tried the following medications with varied success (current meds in bold):   prednisone   flexeril as prescribed by UC - no significant improvement  Ibuprofen 1000 mg-no improvement     Therapeutic modalities and interventional therapies to date include:  -No injections    HPI  Today's visit   Stew Maria ( 1985) is a female with Diagnoses of Lumbar radiculopathy and Left lumbar radiculopathy were pertinent to this visit.    Presents today after   3/22/24 Left L5-S1 and S1 transforaminal epidural steroid injection with improvement in back pain and resolution of sharp radiating pain from her left low back to her left foot.  She reports ongoing dull pain and numbness and tingling in her foot. Rates 6/10 on NRS today. Interferes with ADLs including prolonged sitting or standing.  Worse with bearing weight on her left foot.  Still having weakness in her foot.     She reports taking tylenol or ibuprofen 1000mg QAM and possibly 400-600mg . Takes gabapentin PRN, can't take it more often because it can interfere with work.     Starting PT next month. Doing provider driven home exercise program in the meantime.     Pt denies new numbness, tingling, or weakness.    Procedure history:  - 3/22/24 Left L5-S1 and S1 transforaminal epidural steroid injection -partial improvement        ROS:   Red Flags ROS:   Fever, Chills, Sweats: Denies  Involuntary Weight Loss: Denies  Bladder Incontinence: Denies  Bowel Incontinence: denies  Saddle Anesthesia: Denies    All other systems reviewed and negative.     PMHx:   History reviewed. No pertinent past medical history.    PSHx:   Past Surgical History:   Procedure Laterality Date    BLOCK EPIDURAL STEROID INJECTION Left 3/22/2024    Procedure: LEFT L5-S1 and S1 transforaminal epidural steroid injection with fluoroscopic guidance;  Surgeon: Alanna Mortensen M.D.;  [facility];  Service: Pain Management    [facility]         Family Hx:   History reviewed. No pertinent family history.    Social Hx:  Social History     Socioeconomic History    Marital status:      Spouse name: Not on file    Number of children: Not on file    Years of education: Not on file    Highest education level: Not on file   Occupational History    Not on file   Tobacco Use    Smoking status: Never    Smokeless tobacco: Never   Vaping Use    Vaping Use: Never used   Substance and Sexual Activity    Alcohol use: Yes     Comment: approx  1 per wk    Drug use: No    Sexual activity: Not on file   Other Topics Concern    Not on file   Social History Narrative    Not on file     Social Determinants of Health     Financial Resource Strain: Not on file   Food Insecurity: Not on file   Transportation Needs: Not on file   Physical Activity: Not on file   Stress: Not on file   Social Connections: Not on file   Intimate Partner Violence: Not on file   Housing Stability: Not on file       Allergies:  Allergies   Allergen Reactions    Anti-Itch        Medications: reviewed on epic.   Outpatient Medications Marked as Taking for the 4/19/24 encounter (Office Visit) with Alanna Mortensen M.D.   Medication Sig Dispense Refill    gabapentin (NEURONTIN) 300 MG Cap Take 300mg at bedtime x 1 week, then 300mg BID x 1 week, then 300mg TID thereafter 90 Capsule 2    WELLBUTRIN  MG XL tablet       DULoxetine (CYMBALTA) 60 MG Cap DR Particles delayed-release capsule       spironolactone (ALDACTONE) 25 MG Tab Take 25 mg by mouth every day.          Current Outpatient Medications on File Prior to Visit   Medication Sig Dispense Refill    gabapentin (NEURONTIN) 300 MG Cap Take 300mg at bedtime x 1 week, then 300mg BID x 1 week, then 300mg TID thereafter 90 Capsule 2    WELLBUTRIN  MG XL tablet       DULoxetine (CYMBALTA) 60 MG Cap DR Particles delayed-release capsule       spironolactone (ALDACTONE) 25 MG Tab Take 25 mg by mouth every day.      ZEPBOUND 2.5 MG/0.5ML Solution Auto-injector       predniSONE (DELTASONE) 20 MG Tab 2 tabs po daily x 5 days. 10 Tablet 0    cyclobenzaprine (FLEXERIL) 10 mg Tab Take 1 Tablet by mouth 3 times a day as needed for Moderate Pain or Muscle Spasms. 30 Tablet 0    lidocaine (LIDODERM) 5 % Patch Place 3 Patches on the skin every 12 hours. Up to 3 patches every 12 hours. Must remove patches after 12 hours. 30 Patch 0    albuterol 108 (90 Base) MCG/ACT Aero Soln inhalation aerosol Inhale 2 Puffs every 6 hours as needed for Shortness  "of Breath. 8.5 g 1     No current facility-administered medications on file prior to visit.         EXAMINATION     Physical Exam: Limited due to video visit  /72 (BP Location: Right arm, Patient Position: Sitting, BP Cuff Size: Adult)   Pulse 65   Temp 36.8 °C (98.3 °F) (Temporal)   Ht 1.626 m (5' 4\")   Wt 105 kg (230 lb 13.2 oz)   SpO2 100%     Constitutional:   Body Habitus: Body mass index is 39.62 kg/m².  Cooperation: Fully cooperates with exam  Appearance: Well-groomed, well-nourished.  Gen: no acute distress  HEENT: NCAT, EOMI  Resp: breathing comfortably on RA    Previous exam  Eyes: No scleral icterus to suggest severe liver disease, no proptosis to suggest severe hyperthyroidism    ENT -no obvious auditory deficits, no noticeable facial droop     Skin -no rashes or lesions noted     Respiratory-  breathing comfortably on room air, no audible wheezing    Cardiovascular-distal extremities warm and well perfused.  No lower extremity edema is noted.     Gastrointestinal - no obvious abdominal masses, non-distended    Psychiatric- alert and oriented ×3. Normal affect.     Gait -antalgic gait favoring left leg.       Musculoskeletal and Neuro -      Thoracic/Lumbar Spine/Sacral Spine/Hips   Inspection: No evidence of atrophy in bilateral lower extremities throughout      There is limited active range of motion with lumbar extension     Facet loading maneuver negative bilaterally     Palpation:   Tenderness to palpation over the midline of lumbosacral spine, paraspinal muscles bilaterally, lumbar facets bilaterally spanning approximately L1-L5 levels, and bilateral glutes .      Lumbar spine /hip provocative exam maneuvers  Straight leg raise positive on left, negative on right  FADIR test negative bilaterally     SI joint tests  PAULO test negative bilaterally     Key points for the international standards for neurological classification of spinal cord injury (ISNCSCI) to light touch.   Dermatome R L " "  L2 2 2   L3 2 2   L4 2 2   L5 2 1   S1 2 1   S2 2 2         Motor Exam Lower Extremities  ? Myotome R L   Hip flexion L2 5 4*    Knee extension L3 5 5   Ankle dorsiflexion L4 5 45   Toe extension L5 5 5   Ankle plantarflexion S1 5 5   *Pain limited     Previous exam  Reflexes  ?   R L   Patella   2+ 2+   Achilles    2+ 2+      Clonus of the ankle negative right, positive left          MEDICAL DECISION MAKING    Medical records review: see under HPI section.     DATA    Labs: No new labs available for review since last visit.   Lab Results   Component Value Date/Time    SODIUM 139 03/27/2023 12:22 PM    POTASSIUM 4.2 03/27/2023 12:22 PM    CHLORIDE 104 03/27/2023 12:22 PM    CO2 25 03/27/2023 12:22 PM    ANION 10.0 03/27/2023 12:22 PM    GLUCOSE 113 (H) 03/27/2023 12:22 PM    BUN 12 03/27/2023 12:22 PM    CREATININE 0.83 03/27/2023 12:22 PM    CREATININE 0.8 01/13/2005 09:50 PM    CALCIUM 9.0 03/27/2023 12:22 PM    ASTSGOT 18 03/27/2023 12:22 PM    ALTSGPT 31 03/27/2023 12:22 PM    TBILIRUBIN 0.4 03/27/2023 12:22 PM    ALBUMIN 4.4 03/27/2023 12:22 PM    TOTPROTEIN 7.6 03/27/2023 12:22 PM    GLOBULIN 3.2 03/27/2023 12:22 PM    AGRATIO 1.4 03/27/2023 12:22 PM       No results found for: \"PROTHROMBTM\", \"INR\"     Lab Results   Component Value Date/Time    WBC 11.8 (H) 05/31/2015 04:52 AM    RBC 3.67 (L) 05/31/2015 04:52 AM    HEMOGLOBIN 11.3 (L) 05/31/2015 04:52 AM    HEMATOCRIT 34.0 (L) 05/31/2015 04:52 AM    MCV 92.6 05/31/2015 04:52 AM    MCH 30.8 05/31/2015 04:52 AM    MCHC 33.2 (L) 05/31/2015 04:52 AM    MPV 11.7 05/31/2015 04:52 AM    NEUTSPOLYS 73.60 (H) 05/29/2015 10:20 AM    LYMPHOCYTES 20.00 (L) 05/29/2015 10:20 AM    MONOCYTES 5.50 05/29/2015 10:20 AM    EOSINOPHILS 0.40 05/29/2015 10:20 AM    BASOPHILS 0.20 05/29/2015 10:20 AM        Lab Results   Component Value Date/Time    HBA1C 6.0 (H) 03/27/2023 12:22 PM        Imaging:   I personally reviewed following images, these are my reads  MRI lumbar spine " 3/12/2024  At L5-S1 there is a left paracentral disc protrusion contributing to moderate effacement of the left lateral recess.  There is also abutment of the left S1 nerve at this level.  Appearance of hemangioma at L4.See formal radiology report for further details.          IMAGING radiology reads. I reviewed the following radiology reads                      Results for orders placed during the hospital encounter of 03/12/24    MR-LUMBAR SPINE-W/O    Impression  There is broad-based central and LEFT paracentral disc protrusion at L5-S1 causing moderate effacement of the LEFT lateral recess. The disc is abutting the exiting LEFT S1 nerve root at the lateral recesses.        Results for orders placed during the hospital encounter of 03/12/24    MR-LUMBAR SPINE-W/O    Impression  There is broad-based central and LEFT paracentral disc protrusion at L5-S1 causing moderate effacement of the LEFT lateral recess. The disc is abutting the exiting LEFT S1 nerve root at the lateral recesses.                                        Results for orders placed during the hospital encounter of 04/27/06    DX-CERVICAL SPINE-4+ VIEWS    Impression  IMPRESSION:    CERVICAL SPINE STRAIGHTENING WITH NO EVIDENCE OF FRACTURE.    RG:dxm                        Results for orders placed during the hospital encounter of 03/12/24    DX-LUMBAR SPINE-2 OR 3 VIEWS    Impression  Normal complete lumbar spine series.               Results for orders placed during the hospital encounter of 04/27/06    DX-THORACIC SPINE-2 VIEWS    Impression  IMPRESSION:    OVERALL UNREMARKABLE LIMITED THORACIC SPINE SERIES.    RG:dxm      Read By BETO LESLIE MD on Apr 27 2006  7:07PM  : DXM Transcription Date: Apr 27 2006 11:26PM  THIS DOCUMENT HAS BEEN ELECTRONICALLY SIGNED BY: BETO LESLIE MD on Apr 28 2006  8:08AM            Diagnosis  Visit Diagnoses     ICD-10-CM   1. Lumbar radiculopathy  M54.16   2. Left lumbar radiculopathy  M54.16            ASSESSMENT AND PLAN:  Stew Maria (: 1985) is a female with pain radiating down the left leg with left leg weakness, consistent with left L5 and S1 lumbar radiculopathy given her positive left straight leg raise test reproducing her pain.  Pain is severe and continues to interfere with her ability to safely ambulate at this time.  She has fallen due to her pain.  For her safety, I believe she would benefit from an expedited epidural steroid injection to target her symptoms.     Stew was seen today for follow-up.    Diagnoses and all orders for this visit:    Lumbar radiculopathy    Left lumbar radiculopathy  -     Referral to Pain Clinic            PLAN  Physical Therapy: I previously ordered physical therapy to focus on strengthening and stretching as well as a home exercise program.  The patient is scheduled to start this in May 2024.     Home Exercise Program: I previously provided the patient with a home exercise program focusing on strengthening and stretching.      Diagnostic workup: Again personally reviewed at today's visit:   MRI lumbar spine 3/12/2024     Medications:   -The patient discontinued Mobic and notes no increased improvement with Mobic compared to ibuprofen.  Discussed that  I will would recommend that she limit ibuprofen to 800 mg at once for her safety  - given the neuropathic component of pain, continue gabapentin as needed.  Unfortunately she is unable to take this more frequently on a scheduled basis due to side effects interfering with her work  -Okay to continue Flexeril     Interventions:   -Repeat left L5-S1 and S1 transforaminal epidural steroid injection given partial improvement with this injection in the past and possible augmented improvement with a second epidural steroid injection targeting these levels. The risks, benefits, and alternatives to this procedure were discussed and the patient wishes to proceed with the procedure. Risks include but are  not limited to damage to surrounding structures, infection, bleeding, worsening of pain which can be permanent, and weakness which can be permanent. Benefits include pain relief and improved function. Alternatives include not doing the procedure.      Follow-up: 4 weeks after injection, virtual okay    Orders Placed This Encounter    Referral to Pain Clinic       Alanna Mortensen MD  Interventional Pain and Spine  Physical Medicine and Rehabilitation  [facility]      The above note documents my personal evaluation of this patient. In addition, I have reviewed and confirmed with the patient and MA the supportive information documented in today's Patient Health Questionnaire and Office Note.     Please note that this dictation was created using voice recognition software. I have made every reasonable attempt to correct obvious errors, but I expect that there are errors of grammar and possibly content that I did not discover before finalizing the note.

## 2024-04-19 ENCOUNTER — OFFICE VISIT (OUTPATIENT)
Dept: PHYSICAL MEDICINE AND REHAB | Facility: MEDICAL CENTER | Age: 39
End: 2024-04-19
Payer: COMMERCIAL

## 2024-04-19 VITALS
SYSTOLIC BLOOD PRESSURE: 124 MMHG | HEIGHT: 64 IN | HEART RATE: 65 BPM | WEIGHT: 230.82 LBS | BODY MASS INDEX: 39.41 KG/M2 | TEMPERATURE: 98.3 F | DIASTOLIC BLOOD PRESSURE: 72 MMHG | OXYGEN SATURATION: 100 %

## 2024-04-19 DIAGNOSIS — R29.898 LEFT LEG WEAKNESS: ICD-10-CM

## 2024-04-19 DIAGNOSIS — R20.0 NUMBNESS AND TINGLING OF LEFT LEG: ICD-10-CM

## 2024-04-19 DIAGNOSIS — M54.16 LUMBAR RADICULOPATHY: ICD-10-CM

## 2024-04-19 DIAGNOSIS — R20.2 NUMBNESS AND TINGLING OF LEFT LEG: ICD-10-CM

## 2024-04-19 DIAGNOSIS — Z91.81 RISK FOR FALLS: ICD-10-CM

## 2024-04-19 DIAGNOSIS — M54.16 LEFT LUMBAR RADICULOPATHY: ICD-10-CM

## 2024-04-19 PROCEDURE — 3078F DIAST BP <80 MM HG: CPT | Performed by: STUDENT IN AN ORGANIZED HEALTH CARE EDUCATION/TRAINING PROGRAM

## 2024-04-19 PROCEDURE — 99214 OFFICE O/P EST MOD 30 MIN: CPT | Performed by: STUDENT IN AN ORGANIZED HEALTH CARE EDUCATION/TRAINING PROGRAM

## 2024-04-19 PROCEDURE — 1125F AMNT PAIN NOTED PAIN PRSNT: CPT | Performed by: STUDENT IN AN ORGANIZED HEALTH CARE EDUCATION/TRAINING PROGRAM

## 2024-04-19 PROCEDURE — 3074F SYST BP LT 130 MM HG: CPT | Performed by: STUDENT IN AN ORGANIZED HEALTH CARE EDUCATION/TRAINING PROGRAM

## 2024-04-19 ASSESSMENT — PATIENT HEALTH QUESTIONNAIRE - PHQ9: CLINICAL INTERPRETATION OF PHQ2 SCORE: 0

## 2024-04-19 ASSESSMENT — PAIN SCALES - GENERAL: PAINLEVEL: 6=MODERATE PAIN

## 2024-05-09 ENCOUNTER — APPOINTMENT (OUTPATIENT)
Dept: RADIOLOGY | Facility: REHABILITATION | Age: 39
End: 2024-05-09
Attending: STUDENT IN AN ORGANIZED HEALTH CARE EDUCATION/TRAINING PROGRAM
Payer: COMMERCIAL

## 2024-05-09 ENCOUNTER — HOSPITAL ENCOUNTER (OUTPATIENT)
Facility: REHABILITATION | Age: 39
End: 2024-05-09
Attending: STUDENT IN AN ORGANIZED HEALTH CARE EDUCATION/TRAINING PROGRAM | Admitting: STUDENT IN AN ORGANIZED HEALTH CARE EDUCATION/TRAINING PROGRAM
Payer: COMMERCIAL

## 2024-05-09 VITALS
WEIGHT: 228.4 LBS | TEMPERATURE: 98.2 F | RESPIRATION RATE: 16 BRPM | HEIGHT: 64 IN | BODY MASS INDEX: 38.99 KG/M2 | OXYGEN SATURATION: 100 % | HEART RATE: 76 BPM | DIASTOLIC BLOOD PRESSURE: 65 MMHG | SYSTOLIC BLOOD PRESSURE: 133 MMHG

## 2024-05-09 RX ORDER — DEXAMETHASONE SODIUM PHOSPHATE 10 MG/ML
INJECTION, SOLUTION INTRAMUSCULAR; INTRAVENOUS
Status: COMPLETED
Start: 2024-05-09 | End: 2024-05-09

## 2024-05-09 RX ORDER — LIDOCAINE HYDROCHLORIDE 10 MG/ML
INJECTION, SOLUTION EPIDURAL; INFILTRATION; INTRACAUDAL; PERINEURAL
Status: COMPLETED
Start: 2024-05-09 | End: 2024-05-09

## 2024-05-09 RX ADMIN — LIDOCAINE HYDROCHLORIDE 10 ML: 10 INJECTION, SOLUTION EPIDURAL; INFILTRATION; INTRACAUDAL; PERINEURAL at 10:16

## 2024-05-09 RX ADMIN — IOHEXOL 5 ML: 240 INJECTION, SOLUTION INTRATHECAL; INTRAVASCULAR; INTRAVENOUS; ORAL at 10:16

## 2024-05-09 RX ADMIN — DEXAMETHASONE SODIUM PHOSPHATE 10 MG: 10 INJECTION, SOLUTION INTRAMUSCULAR; INTRAVENOUS at 10:16

## 2024-05-09 ASSESSMENT — PAIN DESCRIPTION - PAIN TYPE
TYPE: CHRONIC PAIN
TYPE: CHRONIC PAIN

## 2024-05-09 NOTE — OP REPORT
Date of Service: 05/09/24    Patient: Stew Maria 38 y.o. female     MRN: [id number]     Physician/s: Alanna Mortensen MD    Pre-operative Diagnosis: Lumbar radiculopathy    Post-operative Diagnosis: Lumbar radiculopathy    Procedure: Lumbar Transforaminal Epidural Steroid Injection at the  left  L5-S1 and S1 levels.     Description of procedure:    The risks, benefits, and alternatives of the procedure were reviewed and discussed with the patient.  Written informed consent was freely obtained. A pre-procedural time-out was conducted by the physician verifying patient’s identity, procedure to be performed, procedure site and side, and allergy verification. Appropriate equipment was determined to be in place for the procedure.     The patient's vital signs were carefully monitored before, throughout, and after the procedure.     In the fluoroscopy suite the patient was placed in a prone position, a pillow placed underneath their umbilicus. The skin was prepped and draped in the usual sterile fashion.     The fluoroscope was placed over the lumbar spine and adjusted into the proper AP/Oblique view to enter the transforaminal space just adjacent to the pedicle at the levels below. The targets for injection were then marked at the left L5-S1 and S1. A 27g 1.5 inch needle was placed into the marked site, and approximately 1mL of 1% Lidocaine was injected subcutaneously into the epidermal and dermal layers. The needle was removed intact.  A 22g 5 inch spinal needle was then placed and advanced under fluoroscopic guidance in an oblique view towards the epidural space of the levels noted above. The needle position was confirmed to not be past the 6 o'clock position in the AP view and it was in the neuroforamen in the lateral view.     Under live fluoroscopic guidance in the AP view, contrast dye was used to highlight the epidural space spread of each level above. Final fluoroscopic images were saved.  Following negative  aspiration, 1.5mL of 1% lidocaine preservative free with 0.5mL of 10mg/mL of dexamethasone was then injected at each level above, and the needles were removed intact after being restyleted. The patient's back was covered with a 4x4 gauze, the area was cleansed with sterile normal saline, and a dressing was applied. There were no complications noted.     The patient was then evaluated post-procedure, and was hemodynamically stable prior to leaving the post-operative care unit.     Follow-up as scheduled    Alanna Mortensen MD  Interventional Pain and Spine  Physical Medicine and Rehabilitation  [facility]    Pain score prior to injection: 5/10 on NRS  Pain score immediately after injection: 5/10 on NRS    CPT codes  Transforaminal epidural injection- lumbar or sacral (first level):  71270  Transforaminal epidural injection- lumbar or sacral (each additional level):  75236

## 2024-05-09 NOTE — INTERVAL H&P NOTE
H&P reviewed. The patient was examined and there are no changes to the H&P    Alanna Mortensen MD  Interventional Pain and Spine  Physical Medicine and Rehabilitation  [facility]

## 2024-06-06 ENCOUNTER — TELEMEDICINE (OUTPATIENT)
Dept: PHYSICAL MEDICINE AND REHAB | Facility: MEDICAL CENTER | Age: 39
End: 2024-06-06
Payer: COMMERCIAL

## 2024-06-06 DIAGNOSIS — R20.0 NUMBNESS AND TINGLING OF LEFT LEG: ICD-10-CM

## 2024-06-06 DIAGNOSIS — M54.42 ACUTE LEFT-SIDED LOW BACK PAIN WITH LEFT-SIDED SCIATICA: ICD-10-CM

## 2024-06-06 DIAGNOSIS — M54.16 LEFT LUMBAR RADICULOPATHY: ICD-10-CM

## 2024-06-06 DIAGNOSIS — M54.16 LUMBAR RADICULOPATHY: ICD-10-CM

## 2024-06-06 DIAGNOSIS — R29.898 LEFT LEG WEAKNESS: ICD-10-CM

## 2024-06-06 DIAGNOSIS — R20.2 NUMBNESS AND TINGLING OF LEFT LEG: ICD-10-CM

## 2024-06-06 PROCEDURE — 99214 OFFICE O/P EST MOD 30 MIN: CPT | Mod: 95 | Performed by: STUDENT IN AN ORGANIZED HEALTH CARE EDUCATION/TRAINING PROGRAM

## 2024-06-06 RX ORDER — MIRTAZAPINE 15 MG/1
TABLET, FILM COATED ORAL
COMMUNITY
Start: 2024-04-07

## 2024-06-06 RX ORDER — HYDROXYZINE 50 MG/1
TABLET, FILM COATED ORAL
COMMUNITY
Start: 2024-05-13

## 2024-06-06 NOTE — PROGRESS NOTES
This encounter was conducted via secure encrypted technology using [employer] videoconferencing.   The patient was in a private location in the patient's home in the [facility]  Verbal consent was obtained. Patient's identity was verified.    Follow-up patient Note    Interventional Pain and Spine  Physiatry (Physical Medicine and Rehabilitation)     Patient Name: Stew Maria  : 1985  Date of service: 2024    Chief Complaint:   Chief Complaint   Patient presents with    Follow-Up     Lumbar radiculopathy         HISTORY FROM INITIAL VISIT (2024):  Stew Maria is a 38 y.o. female who presents today with low back pain radiating down the left leg.  Started 10 days ago with no known inciting event.  Feels like a fluctuating aching sharp pain.  She also endorses numbness and burning at her left posterolateral thigh and her left lateral calf and heel. Endorses weakness in her left leg.  Pain interferes with her ability to walk, or stand or sit for a prolonged time.      Pain right now is 10/10 on the numeric pain scale. Her pain at its best-worse level during the course of the day is typically 9-10/10, respectively.  Pain worsens with walking, bending forward, bending backwards, walking upstairs, walking downstairs, coughing, and sneezing and improves with nothing. Her pain significantly interferes with ADLs. The patient otherwise denies radiating pain, new focal weakness, numbness, or bladder/bowel incontinence. She reports having fluctuating left sciatica for years.  She has managed this with chiropractic therapy.       The patient has not done a provider driven physical therapy program for this problem.     Patient has tried the following medications with varied success (current meds in bold):   prednisone   flexeril as prescribed by UC - no significant improvement  Ibuprofen 1000 mg-no improvement     Therapeutic modalities and interventional therapies to date include:  -No  "injections    HPI  Today's visit   Stew Maria ( 1985) is a female with Diagnoses of Lumbar radiculopathy, Left lumbar radiculopathy, Numbness and tingling of left leg, Left leg weakness, and Acute left-sided low back pain with left-sided sciatica were pertinent to this visit.    Presents today after 2024  left L5-S1 and S1 transforaminal epidural steroid injection with improvement in pain. Still with burning and dull pain in her low back.  Still with some numbness at her thigh and foot.  Still with tightness in her back. Not having radicular symptoms unless she \"overdoes things.\" Pain in the foot fluctuates now.     Started PT.     Takes ibuprofen PRN when pain is severe.       Procedure history:  - 3/22/24 Left L5-S1 and S1 transforaminal epidural steroid injection -partial improvement  -2024  left L5-S1 and S1 transforaminal epidural steroid injection  -partial improvement      ROS:   Red Flags ROS:   Fever, Chills, Sweats: Denies  Involuntary Weight Loss: Denies  Bladder Incontinence: Denies  Bowel Incontinence: denies  Saddle Anesthesia: Denies    All other systems reviewed and negative.     PMHx:   Past Medical History:   Diagnosis Date    Sleep apnea        PSHx:   Past Surgical History:   Procedure Laterality Date    INJ,EPI ANES/STER LUM/SAC ADDL Left 2024    Procedure: LEFT L5-S1 and S1 transforaminal epidural steroid injection with fluoroscopic guidance;  Surgeon: Alanna Mortensen M.D.;  [facility];  Service: Pain Management    BLOCK EPIDURAL STEROID INJECTION Left 3/22/2024    Procedure: LEFT L5-S1 and S1 transforaminal epidural steroid injection with fluoroscopic guidance;  Surgeon: Alanna Mortensen M.D.;  Location: [facility];  Service: Pain Management    LEEP         Family Hx:   No family history on file.    Social Hx:  Social History     Socioeconomic History    Marital status:      Spouse name: Not on file    Number " of children: Not on file    Years of education: Not on file    Highest education level: Not on file   Occupational History    Not on file   Tobacco Use    Smoking status: Never    Smokeless tobacco: Never   Vaping Use    Vaping status: Never Used   Substance and Sexual Activity    Alcohol use: Yes     Comment: approx 1 per wk    Drug use: No    Sexual activity: Not on file   Other Topics Concern    Not on file   Social History Narrative    Not on file     Social Determinants of Health     Financial Resource Strain: Not on file   Food Insecurity: Not on file   Transportation Needs: Not on file   Physical Activity: Not on file   Stress: Not on file   Social Connections: Not on file   Intimate Partner Violence: Not on file   Housing Stability: Not on file       Allergies:  Allergies   Allergen Reactions    Anti-Itch        Medications: reviewed on epic.   Outpatient Medications Marked as Taking for the 6/6/24 encounter (Telemedicine) with Alanna Mortensen M.D.   Medication Sig Dispense Refill    mirtazapine (REMERON) 15 MG Tab       hydrOXYzine HCl (ATARAX) 50 MG Tab       hydrocortisone 2.5 % Cream topical cream       WELLBUTRIN  MG XL tablet       DULoxetine (CYMBALTA) 60 MG Cap DR Particles delayed-release capsule       spironolactone (ALDACTONE) 25 MG Tab Take 25 mg by mouth every day.          Current Outpatient Medications on File Prior to Visit   Medication Sig Dispense Refill    mirtazapine (REMERON) 15 MG Tab       hydrOXYzine HCl (ATARAX) 50 MG Tab       hydrocortisone 2.5 % Cream topical cream       WELLBUTRIN  MG XL tablet       DULoxetine (CYMBALTA) 60 MG Cap DR Particles delayed-release capsule       spironolactone (ALDACTONE) 25 MG Tab Take 25 mg by mouth every day.      gabapentin (NEURONTIN) 300 MG Cap Take 300mg at bedtime x 1 week, then 300mg BID x 1 week, then 300mg TID thereafter 90 Capsule 2    ZEPBOUND 2.5 MG/0.5ML Solution Auto-injector       predniSONE (DELTASONE) 20 MG Tab 2 tabs po  daily x 5 days. 10 Tablet 0    cyclobenzaprine (FLEXERIL) 10 mg Tab Take 1 Tablet by mouth 3 times a day as needed for Moderate Pain or Muscle Spasms. 30 Tablet 0    lidocaine (LIDODERM) 5 % Patch Place 3 Patches on the skin every 12 hours. Up to 3 patches every 12 hours. Must remove patches after 12 hours. 30 Patch 0    albuterol 108 (90 Base) MCG/ACT Aero Soln inhalation aerosol Inhale 2 Puffs every 6 hours as needed for Shortness of Breath. 8.5 g 1     No current facility-administered medications on file prior to visit.         EXAMINATION     Physical Exam: Limited due to video visit  There were no vitals taken for this visit.    Constitutional:   Body Habitus: There is no height or weight on file to calculate BMI.  Cooperation: Fully cooperates with exam  Appearance: Well-groomed, well-nourished.  Gen: no acute distress  HEENT: NCAT, EOMI  Resp: breathing comfortably on RA    Previous exam  Eyes: No scleral icterus to suggest severe liver disease, no proptosis to suggest severe hyperthyroidism    ENT -no obvious auditory deficits, no noticeable facial droop     Skin -no rashes or lesions noted     Respiratory-  breathing comfortably on room air, no audible wheezing    Cardiovascular-distal extremities warm and well perfused.  No lower extremity edema is noted.     Gastrointestinal - no obvious abdominal masses, non-distended    Psychiatric- alert and oriented ×3. Normal affect.     Gait -antalgic gait favoring left leg.       Musculoskeletal and Neuro -      Thoracic/Lumbar Spine/Sacral Spine/Hips   Inspection: No evidence of atrophy in bilateral lower extremities throughout      There is limited active range of motion with lumbar extension     Facet loading maneuver negative bilaterally     Palpation:   Tenderness to palpation over the midline of lumbosacral spine, paraspinal muscles bilaterally, lumbar facets bilaterally spanning approximately L1-L5 levels, and bilateral glutes .      Lumbar spine /hip  "provocative exam maneuvers  Straight leg raise positive on left, negative on right  FADIR test negative bilaterally     SI joint tests  PAULO test negative bilaterally     Key points for the international standards for neurological classification of spinal cord injury (ISNCSCI) to light touch.   Dermatome R L   L2 2 2   L3 2 2   L4 2 2   L5 2 1   S1 2 1   S2 2 2         Motor Exam Lower Extremities  ? Myotome R L   Hip flexion L2 5 4*    Knee extension L3 5 5   Ankle dorsiflexion L4 5 45   Toe extension L5 5 5   Ankle plantarflexion S1 5 5   *Pain limited     Previous exam  Reflexes  ?   R L   Patella   2+ 2+   Achilles    2+ 2+      Clonus of the ankle negative right, positive left          MEDICAL DECISION MAKING    Medical records review: see under HPI section.     DATA    Labs: No new labs available for review since last visit.   Lab Results   Component Value Date/Time    SODIUM 139 03/27/2023 12:22 PM    POTASSIUM 4.2 03/27/2023 12:22 PM    CHLORIDE 104 03/27/2023 12:22 PM    CO2 25 03/27/2023 12:22 PM    ANION 10.0 03/27/2023 12:22 PM    GLUCOSE 113 (H) 03/27/2023 12:22 PM    BUN 12 03/27/2023 12:22 PM    CREATININE 0.83 03/27/2023 12:22 PM    CREATININE 0.8 01/13/2005 09:50 PM    CALCIUM 9.0 03/27/2023 12:22 PM    ASTSGOT 18 03/27/2023 12:22 PM    ALTSGPT 31 03/27/2023 12:22 PM    TBILIRUBIN 0.4 03/27/2023 12:22 PM    ALBUMIN 4.4 03/27/2023 12:22 PM    TOTPROTEIN 7.6 03/27/2023 12:22 PM    GLOBULIN 3.2 03/27/2023 12:22 PM    AGRATIO 1.4 03/27/2023 12:22 PM       No results found for: \"PROTHROMBTM\", \"INR\"     Lab Results   Component Value Date/Time    WBC 11.8 (H) 05/31/2015 04:52 AM    RBC 3.67 (L) 05/31/2015 04:52 AM    HEMOGLOBIN 11.3 (L) 05/31/2015 04:52 AM    HEMATOCRIT 34.0 (L) 05/31/2015 04:52 AM    MCV 92.6 05/31/2015 04:52 AM    MCH 30.8 05/31/2015 04:52 AM    MCHC 33.2 (L) 05/31/2015 04:52 AM    MPV 11.7 05/31/2015 04:52 AM    NEUTSPOLYS 73.60 (H) 05/29/2015 10:20 AM    LYMPHOCYTES 20.00 (L) " 05/29/2015 10:20 AM    MONOCYTES 5.50 05/29/2015 10:20 AM    EOSINOPHILS 0.40 05/29/2015 10:20 AM    BASOPHILS 0.20 05/29/2015 10:20 AM        Lab Results   Component Value Date/Time    HBA1C 6.0 (H) 03/27/2023 12:22 PM        Imaging:   I personally reviewed following images, these are my reads  MRI lumbar spine 3/12/2024  At L5-S1 there is a left paracentral disc protrusion contributing to moderate effacement of the left lateral recess.  There is also abutment of the left S1 nerve at this level.  Appearance of hemangioma at L4.See formal radiology report for further details.          IMAGING radiology reads. I reviewed the following radiology reads                      Results for orders placed during the hospital encounter of 03/12/24    MR-LUMBAR SPINE-W/O    Impression  There is broad-based central and LEFT paracentral disc protrusion at L5-S1 causing moderate effacement of the LEFT lateral recess. The disc is abutting the exiting LEFT S1 nerve root at the lateral recesses.        Results for orders placed during the hospital encounter of 03/12/24    MR-LUMBAR SPINE-W/O    Impression  There is broad-based central and LEFT paracentral disc protrusion at L5-S1 causing moderate effacement of the LEFT lateral recess. The disc is abutting the exiting LEFT S1 nerve root at the lateral recesses.                                        Results for orders placed during the hospital encounter of 04/27/06    DX-CERVICAL SPINE-4+ VIEWS    Impression  IMPRESSION:    CERVICAL SPINE STRAIGHTENING WITH NO EVIDENCE OF FRACTURE.    [facility]:dxm                        Results for orders placed during the hospital encounter of 03/12/24    DX-LUMBAR SPINE-2 OR 3 VIEWS    Impression  Normal complete lumbar spine series.               Results for orders placed during the hospital encounter of 04/27/06    DX-THORACIC SPINE-2 VIEWS    Impression  IMPRESSION:    OVERALL UNREMARKABLE LIMITED THORACIC SPINE SERIES.    [facility]:dxm      Read By  BETO LESLIE MD on 2006  7:07PM  : REA Transcription Date: 2006 11:26PM  THIS DOCUMENT HAS BEEN ELECTRONICALLY SIGNED BY: BETO LESLIE MD on 2006  8:08AM            Diagnosis  Visit Diagnoses     ICD-10-CM   1. Lumbar radiculopathy  M54.16   2. Left lumbar radiculopathy  M54.16   3. Numbness and tingling of left leg  R20.0    R20.2   4. Left leg weakness  R29.898   5. Acute left-sided low back pain with left-sided sciatica  M54.42             ASSESSMENT AND PLAN:  Stew Maria (: 1985) is a female with pain radiating down the left leg with left leg weakness, consistent with left L5 and S1 lumbar radiculopathy given her positive left straight leg raise test reproducing her pain.       Stew was seen today for follow-up.    Diagnoses and all orders for this visit:    Lumbar radiculopathy    Left lumbar radiculopathy    Numbness and tingling of left leg    Left leg weakness    Acute left-sided low back pain with left-sided sciatica              PLAN  Physical Therapy: Continue physical therapy     Home Exercise Program: I previously provided the patient with a home exercise program focusing on strengthening and stretching.      Diagnostic workup: Again personally reviewed at today's visit: MRI lumbar spine 3/12/2024     Medications:   -The patient discontinued Mobic and notes no increased improvement with Mobic compared to ibuprofen.  Discussed that  I will would recommend that she limit ibuprofen to 800 mg at once for her safety  - given the neuropathic component of pain, okay to continue gabapentin as needed.  Unfortunately she is unable to take this more frequently on a scheduled basis due to side effects interfering with her work  -Okay to continue Flexeril     Interventions:   -Repeat left L5-S1 and S1 transforaminal epidural steroid injection as needed    Follow-up: 6-8 weeks    Orders Placed This Encounter    mirtazapine (REMERON) 15 MG Tab     hydrOXYzine HCl (ATARAX) 50 MG Tab    hydrocortisone 2.5 % Cream topical cream       Alanna Mortensen MD  Interventional Pain and Spine  Physical Medicine and Rehabilitation  [facility]      The above note documents my personal evaluation of this patient. In addition, I have reviewed and confirmed with the patient and MA the supportive information documented in today's Patient Health Questionnaire and Office Note.     Please note that this dictation was created using voice recognition software. I have made every reasonable attempt to correct obvious errors, but I expect that there are errors of grammar and possibly content that I did not discover before finalizing the note.

## 2024-07-03 ENCOUNTER — APPOINTMENT (OUTPATIENT)
Dept: PHYSICAL MEDICINE AND REHAB | Facility: MEDICAL CENTER | Age: 39
End: 2024-07-03
Payer: COMMERCIAL

## 2024-07-18 ENCOUNTER — OFFICE VISIT (OUTPATIENT)
Dept: PHYSICAL MEDICINE AND REHAB | Facility: MEDICAL CENTER | Age: 39
End: 2024-07-18
Payer: COMMERCIAL

## 2024-07-18 VITALS
BODY MASS INDEX: 36.56 KG/M2 | TEMPERATURE: 97.8 F | HEART RATE: 80 BPM | DIASTOLIC BLOOD PRESSURE: 70 MMHG | WEIGHT: 213 LBS | SYSTOLIC BLOOD PRESSURE: 113 MMHG | OXYGEN SATURATION: 95 %

## 2024-07-18 DIAGNOSIS — M54.16 LEFT LUMBAR RADICULOPATHY: ICD-10-CM

## 2024-07-18 DIAGNOSIS — M54.32 SCIATICA OF LEFT SIDE: ICD-10-CM

## 2024-07-18 DIAGNOSIS — M54.2 CERVICALGIA: ICD-10-CM

## 2024-07-18 DIAGNOSIS — R20.0 NUMBNESS AND TINGLING OF LEFT LEG: ICD-10-CM

## 2024-07-18 DIAGNOSIS — M54.42 ACUTE LEFT-SIDED LOW BACK PAIN WITH LEFT-SIDED SCIATICA: ICD-10-CM

## 2024-07-18 DIAGNOSIS — R20.2 NUMBNESS AND TINGLING OF LEFT LEG: ICD-10-CM

## 2024-07-18 DIAGNOSIS — M54.16 LUMBAR RADICULOPATHY: ICD-10-CM

## 2024-07-18 DIAGNOSIS — R29.898 LEFT LEG WEAKNESS: ICD-10-CM

## 2024-07-18 PROCEDURE — 99214 OFFICE O/P EST MOD 30 MIN: CPT | Performed by: STUDENT IN AN ORGANIZED HEALTH CARE EDUCATION/TRAINING PROGRAM

## 2024-07-18 PROCEDURE — 3078F DIAST BP <80 MM HG: CPT | Performed by: STUDENT IN AN ORGANIZED HEALTH CARE EDUCATION/TRAINING PROGRAM

## 2024-07-18 PROCEDURE — 1125F AMNT PAIN NOTED PAIN PRSNT: CPT | Performed by: STUDENT IN AN ORGANIZED HEALTH CARE EDUCATION/TRAINING PROGRAM

## 2024-07-18 PROCEDURE — 3074F SYST BP LT 130 MM HG: CPT | Performed by: STUDENT IN AN ORGANIZED HEALTH CARE EDUCATION/TRAINING PROGRAM

## 2024-07-18 RX ORDER — CYCLOBENZAPRINE HCL 10 MG
10 TABLET ORAL 3 TIMES DAILY PRN
Qty: 90 TABLET | Refills: 2 | Status: SHIPPED | OUTPATIENT
Start: 2024-07-18

## 2024-07-18 RX ORDER — RAMELTEON 8 MG/1
TABLET ORAL
COMMUNITY
Start: 2024-07-15

## 2024-07-18 ASSESSMENT — PAIN SCALES - GENERAL: PAINLEVEL: 3=SLIGHT PAIN

## 2024-07-18 ASSESSMENT — PATIENT HEALTH QUESTIONNAIRE - PHQ9: CLINICAL INTERPRETATION OF PHQ2 SCORE: 0
